# Patient Record
Sex: FEMALE | Race: WHITE | NOT HISPANIC OR LATINO | Employment: UNEMPLOYED | URBAN - METROPOLITAN AREA
[De-identification: names, ages, dates, MRNs, and addresses within clinical notes are randomized per-mention and may not be internally consistent; named-entity substitution may affect disease eponyms.]

---

## 2023-09-14 ENCOUNTER — HOSPITAL ENCOUNTER (OUTPATIENT)
Dept: DATA CONVERSION | Facility: HOSPITAL | Age: 58
Discharge: HOME | End: 2023-09-14
Payer: COMMERCIAL

## 2023-09-14 DIAGNOSIS — E89.0 POSTPROCEDURAL HYPOTHYROIDISM: ICD-10-CM

## 2023-09-14 DIAGNOSIS — E20.89 OTHER HYPOPARATHYROIDISM (CMS-HCC): ICD-10-CM

## 2023-09-14 LAB
ANION GAP SERPL CALCULATED.3IONS-SCNC: 15 MMOL/L (ref 0–19)
BUN SERPL-MCNC: 24 MG/DL (ref 8–25)
BUN/CREAT SERPL: 24 RATIO (ref 8–21)
CALCIUM SERPL-MCNC: 9 MG/DL (ref 8.5–10.4)
CHLORIDE SERPL-SCNC: 102 MMOL/L (ref 97–107)
CO2 SERPL-SCNC: 24 MMOL/L (ref 24–31)
CREAT SERPL-MCNC: 1 MG/DL (ref 0.4–1.6)
GFR SERPL CREATININE-BSD FRML MDRD: 66 ML/MIN/1.73 M2
GLUCOSE SERPL-MCNC: 185 MG/DL (ref 65–99)
POTASSIUM SERPL-SCNC: 3.7 MMOL/L (ref 3.4–5.1)
SODIUM SERPL-SCNC: 141 MMOL/L (ref 133–145)
TSH SERPL DL<=0.05 MIU/L-ACNC: 0.59 MIU/L (ref 0.27–4.2)

## 2023-09-15 PROBLEM — E20.9 HYPOPARATHYROIDISM (MULTI): Status: ACTIVE | Noted: 2021-04-09

## 2023-09-15 PROBLEM — T16.1XXA FOREIGN BODY OF RIGHT EAR: Status: ACTIVE | Noted: 2023-09-15

## 2023-09-15 PROBLEM — G93.9 PONTINE LESION: Status: ACTIVE | Noted: 2023-09-15

## 2023-09-15 PROBLEM — H53.2 DIPLOPIA: Status: ACTIVE | Noted: 2023-09-15

## 2023-09-15 PROBLEM — M79.89 LEFT LEG SWELLING: Status: ACTIVE | Noted: 2022-04-04

## 2023-09-15 PROBLEM — H90.3 SENSORINEURAL HEARING LOSS, BILATERAL: Status: ACTIVE | Noted: 2023-09-15

## 2023-09-15 PROBLEM — E66.9 OBESITY: Status: ACTIVE | Noted: 2023-09-15

## 2023-09-15 PROBLEM — I82.90 VENOUS THROMBOSIS: Status: ACTIVE | Noted: 2023-09-15

## 2023-09-15 PROBLEM — K21.9 HIATAL HERNIA WITH GERD: Status: ACTIVE | Noted: 2021-01-12

## 2023-09-15 PROBLEM — G47.33 OBSTRUCTIVE SLEEP APNEA: Status: ACTIVE | Noted: 2019-04-22

## 2023-09-15 PROBLEM — R20.2 PARESTHESIA OF UPPER EXTREMITY: Status: ACTIVE | Noted: 2023-09-15

## 2023-09-15 PROBLEM — R07.9 CHEST PAIN: Status: ACTIVE | Noted: 2021-01-12

## 2023-09-15 PROBLEM — G45.9 TRANSIENT ISCHEMIC ATTACK: Status: ACTIVE | Noted: 2023-09-15

## 2023-09-15 PROBLEM — R94.31 ABNORMAL ECG: Status: ACTIVE | Noted: 2021-01-12

## 2023-09-15 PROBLEM — R51.9 FREQUENT HEADACHES: Status: ACTIVE | Noted: 2023-09-15

## 2023-09-15 PROBLEM — E04.9 GOITER: Status: ACTIVE | Noted: 2018-05-21

## 2023-09-15 PROBLEM — E03.9 ACQUIRED HYPOTHYROIDISM: Status: ACTIVE | Noted: 2022-04-12

## 2023-09-15 PROBLEM — E83.51 HYPOCALCEMIA: Status: ACTIVE | Noted: 2023-06-12

## 2023-09-15 PROBLEM — R92.8 ABNORMAL MRI, BREAST: Status: ACTIVE | Noted: 2022-12-02

## 2023-09-15 PROBLEM — R53.83 FATIGUE: Status: ACTIVE | Noted: 2019-04-03

## 2023-09-15 PROBLEM — K21.9 GE REFLUX: Status: ACTIVE | Noted: 2018-05-21

## 2023-09-15 PROBLEM — H51.11 CONVERGENCE INSUFFICIENCY: Status: ACTIVE | Noted: 2023-09-15

## 2023-09-15 PROBLEM — E89.0 POSTPROCEDURAL HYPOTHYROIDISM: Status: ACTIVE | Noted: 2023-09-15

## 2023-09-15 PROBLEM — R06.83 SNORING: Status: ACTIVE | Noted: 2019-04-03

## 2023-09-15 PROBLEM — R06.89 IMPAIRED GAS EXCHANGE: Status: ACTIVE | Noted: 2023-09-15

## 2023-09-15 PROBLEM — H50.00 ESOTROPIA: Status: ACTIVE | Noted: 2023-09-15

## 2023-09-15 PROBLEM — M23.90 KNEE INTERNAL DERANGEMENT: Status: ACTIVE | Noted: 2019-06-03

## 2023-09-15 PROBLEM — A69.20 LYME DISEASE: Status: ACTIVE | Noted: 2023-09-15

## 2023-09-15 PROBLEM — H69.90 DYSFUNCTION OF EUSTACHIAN TUBE: Status: ACTIVE | Noted: 2021-09-21

## 2023-09-15 PROBLEM — H60.91 RIGHT OTITIS EXTERNA: Status: ACTIVE | Noted: 2019-05-21

## 2023-09-15 PROBLEM — H47.323 DRUSEN OF OPTIC DISC, BILATERAL: Status: ACTIVE | Noted: 2023-09-15

## 2023-09-15 PROBLEM — H25.099 INCIPIENT SENILE CATARACT: Status: ACTIVE | Noted: 2023-09-15

## 2023-09-15 PROBLEM — E89.2 HYPOPARATHYROIDISM AFTER PROCEDURE (MULTI): Status: ACTIVE | Noted: 2019-12-19

## 2023-09-15 PROBLEM — K44.9 HIATAL HERNIA WITH GERD: Status: ACTIVE | Noted: 2021-01-12

## 2023-09-15 PROBLEM — E78.5 HYPERLIPIDEMIA: Status: ACTIVE | Noted: 2018-05-21

## 2023-09-15 PROBLEM — R92.30 BREAST DENSITY: Status: ACTIVE | Noted: 2022-11-01

## 2023-09-15 RX ORDER — OXYCODONE AND ACETAMINOPHEN 5; 325 MG/1; MG/1
1 TABLET ORAL EVERY 4 HOURS PRN
COMMUNITY
End: 2024-04-10 | Stop reason: ALTCHOICE

## 2023-09-15 RX ORDER — CALCITRIOL 0.25 UG/1
0.25 CAPSULE ORAL DAILY
COMMUNITY
Start: 2018-05-21 | End: 2024-04-10 | Stop reason: ALTCHOICE

## 2023-09-15 RX ORDER — PSEUDOEPHEDRINE HCL 30 MG
250 TABLET ORAL
COMMUNITY
Start: 2021-04-09 | End: 2024-04-10 | Stop reason: ALTCHOICE

## 2023-09-15 RX ORDER — MAGNESIUM 250 MG
1 TABLET ORAL DAILY
COMMUNITY
End: 2024-05-01 | Stop reason: WASHOUT

## 2023-09-15 RX ORDER — OMEGA-3-ACID ETHYL ESTERS 1 G/1
1 CAPSULE, LIQUID FILLED ORAL DAILY
COMMUNITY
End: 2024-05-01 | Stop reason: WASHOUT

## 2023-09-15 RX ORDER — LEVOTHYROXINE SODIUM 150 UG/1
150 TABLET ORAL
COMMUNITY
End: 2024-04-22 | Stop reason: ALTCHOICE

## 2023-09-15 RX ORDER — LANOLIN ALCOHOL/MO/W.PET/CERES
400 CREAM (GRAM) TOPICAL DAILY
COMMUNITY

## 2023-09-15 RX ORDER — LEVOTHYROXINE SODIUM 125 UG/1
125 TABLET ORAL DAILY
COMMUNITY
Start: 2020-09-11 | End: 2024-02-02

## 2023-09-15 RX ORDER — ACETAMINOPHEN AND CODEINE PHOSPHATE 120; 12 MG/5ML; MG/5ML
12.5 SOLUTION ORAL EVERY 4 HOURS PRN
COMMUNITY
End: 2024-04-10 | Stop reason: ALTCHOICE

## 2023-09-15 RX ORDER — GLUC/MSM/COLGN2/HYAL/ANTIARTH3 375-375-20
1 TABLET ORAL EVERY 4 HOURS
COMMUNITY

## 2023-09-15 RX ORDER — PETROLATUM,WHITE/LANOLIN
1 OINTMENT (GRAM) TOPICAL 3 TIMES DAILY
COMMUNITY

## 2023-09-19 ENCOUNTER — HOSPITAL ENCOUNTER (OUTPATIENT)
Dept: DATA CONVERSION | Facility: HOSPITAL | Age: 58
Discharge: HOME | End: 2023-09-19
Payer: COMMERCIAL

## 2023-09-19 DIAGNOSIS — E20.89 OTHER HYPOPARATHYROIDISM (CMS-HCC): ICD-10-CM

## 2023-09-19 LAB
SPECIMEN VOL 24H UR: 1300 ML.
TIME PERIOD MINUTES: 1440 MINUTES

## 2023-09-20 LAB — CALCIUM 24H UR-MRATE: 257.4 (ref 0–300)

## 2023-09-29 ENCOUNTER — HOSPITAL ENCOUNTER (OUTPATIENT)
Dept: DATA CONVERSION | Facility: HOSPITAL | Age: 58
Discharge: HOME | End: 2023-09-29
Payer: COMMERCIAL

## 2023-09-29 DIAGNOSIS — K59.09 OTHER CONSTIPATION: ICD-10-CM

## 2023-09-29 DIAGNOSIS — R92.8 OTHER ABNORMAL AND INCONCLUSIVE FINDINGS ON DIAGNOSTIC IMAGING OF BREAST: ICD-10-CM

## 2023-09-29 LAB
ANION GAP SERPL CALCULATED.3IONS-SCNC: 13 MMOL/L (ref 0–19)
BUN SERPL-MCNC: 24 MG/DL (ref 8–25)
BUN/CREAT SERPL: 26.7 RATIO (ref 8–21)
CALCIUM SERPL-MCNC: 8.7 MG/DL (ref 8.5–10.4)
CHLORIDE SERPL-SCNC: 106 MMOL/L (ref 97–107)
CO2 SERPL-SCNC: 25 MMOL/L (ref 24–31)
CREAT SERPL-MCNC: 0.9 MG/DL (ref 0.4–1.6)
GFR SERPL CREATININE-BSD FRML MDRD: 75 ML/MIN/1.73 M2
GLUCOSE SERPL-MCNC: 111 MG/DL (ref 65–99)
POTASSIUM SERPL-SCNC: 4 MMOL/L (ref 3.4–5.1)
SODIUM SERPL-SCNC: 143 MMOL/L (ref 133–145)

## 2023-10-02 PROCEDURE — 80048 BASIC METABOLIC PNL TOTAL CA: CPT

## 2023-10-17 ENCOUNTER — OFFICE VISIT (OUTPATIENT)
Dept: OTOLARYNGOLOGY | Facility: CLINIC | Age: 58
End: 2023-10-17
Payer: COMMERCIAL

## 2023-10-17 VITALS — TEMPERATURE: 97.5 F | HEIGHT: 65 IN | WEIGHT: 225 LBS | BODY MASS INDEX: 37.49 KG/M2

## 2023-10-17 DIAGNOSIS — J33.8 MAXILLARY SINUS POLYP: Primary | ICD-10-CM

## 2023-10-17 DIAGNOSIS — H90.3 SENSORINEURAL HEARING LOSS, BILATERAL: ICD-10-CM

## 2023-10-17 DIAGNOSIS — D32.9 MENINGIOMA (MULTI): ICD-10-CM

## 2023-10-17 DIAGNOSIS — E20.89 OTHER HYPOPARATHYROIDISM (CMS-HCC): ICD-10-CM

## 2023-10-17 PROCEDURE — 99214 OFFICE O/P EST MOD 30 MIN: CPT | Performed by: OTOLARYNGOLOGY

## 2023-10-17 PROCEDURE — 1036F TOBACCO NON-USER: CPT | Performed by: OTOLARYNGOLOGY

## 2023-10-17 NOTE — PROGRESS NOTES
Chief Complaint   Patient presents with    Sinus Problem     LOV: 1/2021 - SINUS ISSUES, HAD MRI DONE      HPI  She is being followed by neurology and neurosurgery for meningioma in the right cavernous sinus status post gamma knife radiation.  The tumor has gotten smaller over the years and she is now on a 3-year recall.  Her most recent MRI showed mild thickening in the frontal and ethmoid sinus mucosa and a mucous retention cyst in the left maxillary sinus.  She is asymptomatic from that standpoint.  She is followed at the [facility] for her hearing loss and tinnitus.  The tinnitus has become more prominent  Beth Leigh is a 57 y.o. female  status post completion thyroidectomy for multinodular goiter in 2015 per Dr. Gutiérrez. She had some postoperative hypocalcimia and hypoparathyroidism. She is being maintained on calcium and Rocaltrol with stable calcium levels. She has bilateral hearing loss and wears bilateral behind-the-ear hearing aids.     Past Medical History:   Diagnosis Date    Neoplasm of unspecified behavior of brain (CMS/HCC)     Brain tumor    Personal history of other benign neoplasm 05/25/2021    History of meningioma    Personal history of other diseases of the digestive system     History of gastroesophageal reflux (GERD)    Personal history of other diseases of the musculoskeletal system and connective tissue     History of arthritis    Personal history of other endocrine, nutritional and metabolic disease     History of thyroid disorder    Sixth (abducent) nerve palsy, 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    Sixth cranial nerve palsy    Sixth (abducent) nerve palsy, 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    Sixth cranial nerve palsy            Medications:     Current Outpatient Medications:     acetaminophen with codeine (acetaminophen-codeine) 300 mg-30 mg /12.5 mL solution oral solution, Take 12.5 mL by mouth every 4 hours if needed (mild pain)., Disp: , Rfl:     ascorbic acid/collagen hydr (ASCORBIC  "ACID-COLLAGEN ORAL), Take by mouth., Disp: , Rfl:     aspirin 81 mg capsule, Take 1 tablet by mouth once daily., Disp: , Rfl:     calcitriol (Rocaltrol) 0.25 mcg capsule, Take 1 capsule (0.25 mcg) by mouth once daily., Disp: , Rfl:     calcium carbonate-vitamin D3 (Calcium 600 + D,3,) 600 mg-5 mcg (200 unit) capsule, Take 1 tablet by mouth every 4 hours., Disp: , Rfl:     calcium citrate 250 mg calcium tablet, Take 1 tablet (250 mg) by mouth., Disp: , Rfl:     cholecalciferol, vitamin D3, (D-3-5 ORAL), Take by mouth., Disp: , Rfl:     gluc/chondr-msm 7/C/gabriella/boron (GLUCOSAMINE-CHONDR, BOSWELLIA, ORAL), Take by mouth., Disp: , Rfl:     glucosamine sulfate 500 mg capsule, Take 1 capsule by mouth 3 times a day., Disp: , Rfl:     levothyroxine (Synthroid) 150 mcg tablet, 1 tablet (150 mcg) once daily in the morning. Take before meals., Disp: , Rfl:     levothyroxine (Synthroid, Levoxyl) 125 mcg tablet, Take 1 tablet (125 mcg) by mouth once daily., Disp: , Rfl:     magnesium 250 mg tablet, Take 1 tablet (250 mg) by mouth once daily., Disp: , Rfl:     magnesium oxide (Mag-Ox) 400 mg (241.3 mg magnesium) tablet, Take 1 tablet (400 mg) by mouth once daily., Disp: , Rfl:     multivit-min/iron/folic acid/K (ADULTS MULTIVITAMIN ORAL), Take 1 tablet by mouth once daily., Disp: , Rfl:     omega-3 acid ethyl esters (Lovaza) 1 gram capsule, Take 1 capsule (1 g) by mouth once daily., Disp: , Rfl:     oxyCODONE-acetaminophen (Percocet) 5-325 mg tablet, Take 1 tablet by mouth every 4 hours if needed for moderate pain (4 - 6)., Disp: , Rfl:     turmeric, bulk, 95 % powder, Take by mouth., Disp: , Rfl:     wheat dextrin (BENEFIBER HEALTHY SHAPE ORAL), Benefiber, Disp: , Rfl:      Allergies:  Allergies   Allergen Reactions    Acyclovir Unknown    Bacitracin Unknown    Sulfa (Sulfonamide Antibiotics) Hives        Physical Exam:  Last Recorded Vitals  Temperature 36.4 °C (97.5 °F), height 1.638 m (5' 4.5\"), weight 102 kg (225 " lb).  []General appearance: Well-developed, well-nourished in no acute distress, conversant with normal voice quality    Head/face: No erythema or edema or facial tenderness, and normal facial nerve function bilaterally    External ear: Clear external auditory canals with normal pinnae  Tube status: N/A  Middle ear: Tympanic membranes intact and mobile, middle ears normal.  Tympanic membrane perforation: N/A  Mastoid bowl: N/A  Hearing: Normal conversational awareness at normal speech thresholds    Nose visualized using: Anterior rhinoscopy  Nasal dorsum: Nontraumatic midline appearance  Septum: Midline, nonobstructing  Inferior turbinates: Normal, pink  Secretions: Dry    Oral cavity and oropharynx: Normal  Teeth: Good condition  Floor of mouth: without lesions  Palate: Normal hard palate, soft palate and uvula  Oropharynx: Clear, no lesions present  Buccal mucosa: Normal without masses or lesions  Lips: Normal    Nasopharynx: Inadequate mirror exam secondary to gag/anatomy    Larynx and hypopharynx: Mirror examination adequate and revealed epiglottis normal, vocal cord mobility normal, normal mucosa, false vocal cords normal, true vocal cords normal    Neck:  Salivary glands: Normal bilateral parotid and submandibular glands by inspection and palpation.  Non-thyroid masses: No palpable masses or significant lymphadenopathy  Trachea: Midline  Thyroid: Status post thyroidectomy  Temporomandibular joint: Nontender  Cervical range of motion: Normal    Neurologic exam: Alert and oriented x3, appropriate affect.  Cranial nerves II-XII normal bilaterally  Extraocular movement: Extraocular movement intact, normal gaze alignment      Assessment/Plan   No diagnosis found.      I have given her reassurance regarding her MRI sinus findings.  This is relatively asymptomatic.  She will continue with her hearing aids for her progressive sensorineural hearing loss and tinnitus.  Follow-up as needed    Chris Underwood MD

## 2023-10-30 DIAGNOSIS — R92.8 ABNORMAL MRI, BREAST: Primary | ICD-10-CM

## 2023-10-31 ENCOUNTER — HOSPITAL ENCOUNTER (OUTPATIENT)
Dept: RADIOLOGY | Facility: HOSPITAL | Age: 58
Discharge: HOME | End: 2023-10-31
Payer: COMMERCIAL

## 2023-10-31 ENCOUNTER — TELEPHONE (OUTPATIENT)
Dept: PRIMARY CARE | Facility: CLINIC | Age: 58
End: 2023-10-31

## 2023-10-31 VITALS — BODY MASS INDEX: 35.82 KG/M2 | WEIGHT: 215 LBS | HEIGHT: 65 IN

## 2023-10-31 DIAGNOSIS — R92.8 OTHER ABNORMAL AND INCONCLUSIVE FINDINGS ON DIAGNOSTIC IMAGING OF BREAST: ICD-10-CM

## 2023-10-31 PROCEDURE — 2550000001 HC RX 255 CONTRASTS: Performed by: PHYSICIAN ASSISTANT

## 2023-10-31 PROCEDURE — C8908 MRI W/O FOL W/CONT, BREAST,: HCPCS

## 2023-10-31 PROCEDURE — 77049 MRI BREAST C-+ W/CAD BI: CPT | Mod: 50

## 2023-10-31 PROCEDURE — A9575 INJ GADOTERATE MEGLUMI 0.1ML: HCPCS | Performed by: PHYSICIAN ASSISTANT

## 2023-10-31 RX ORDER — GADOTERATE MEGLUMINE 376.9 MG/ML
19 INJECTION INTRAVENOUS
Status: COMPLETED | OUTPATIENT
Start: 2023-10-31 | End: 2023-10-31

## 2023-10-31 RX ADMIN — GADOTERATE MEGLUMINE 19 ML: 376.9 INJECTION INTRAVENOUS at 08:20

## 2023-10-31 NOTE — TELEPHONE ENCOUNTER
Pt got the multi collagen protein made by [employer]. She is wanting to know if this will affect with any of the medications she is on   Contact -[phone]

## 2024-02-02 DIAGNOSIS — E89.0 POSTPROCEDURAL HYPOTHYROIDISM: Primary | ICD-10-CM

## 2024-02-02 RX ORDER — LEVOTHYROXINE SODIUM 125 UG/1
125 TABLET ORAL DAILY
Qty: 90 TABLET | Refills: 1 | Status: SHIPPED | OUTPATIENT
Start: 2024-02-02

## 2024-04-10 ENCOUNTER — OFFICE VISIT (OUTPATIENT)
Dept: PRIMARY CARE | Facility: CLINIC | Age: 59
End: 2024-04-10
Payer: COMMERCIAL

## 2024-04-10 ENCOUNTER — TELEPHONE (OUTPATIENT)
Dept: PRIMARY CARE | Facility: CLINIC | Age: 59
End: 2024-04-10

## 2024-04-10 VITALS
HEIGHT: 65 IN | HEART RATE: 88 BPM | DIASTOLIC BLOOD PRESSURE: 72 MMHG | TEMPERATURE: 97.2 F | BODY MASS INDEX: 35.49 KG/M2 | OXYGEN SATURATION: 98 % | WEIGHT: 213 LBS | SYSTOLIC BLOOD PRESSURE: 114 MMHG

## 2024-04-10 DIAGNOSIS — Z12.31 VISIT FOR SCREENING MAMMOGRAM: ICD-10-CM

## 2024-04-10 DIAGNOSIS — M79.622 LEFT UPPER ARM PAIN: Primary | ICD-10-CM

## 2024-04-10 PROCEDURE — 1036F TOBACCO NON-USER: CPT

## 2024-04-10 PROCEDURE — 99213 OFFICE O/P EST LOW 20 MIN: CPT

## 2024-04-10 ASSESSMENT — PATIENT HEALTH QUESTIONNAIRE - PHQ9
SUM OF ALL RESPONSES TO PHQ9 QUESTIONS 1 AND 2: 0
1. LITTLE INTEREST OR PLEASURE IN DOING THINGS: NOT AT ALL
2. FEELING DOWN, DEPRESSED OR HOPELESS: NOT AT ALL

## 2024-04-10 ASSESSMENT — PAIN SCALES - GENERAL: PAINLEVEL: 5

## 2024-04-10 NOTE — TELEPHONE ENCOUNTER
PATIENT WOULD LIKE A REFERRAL FOR MAMMOGRAM AND A MRI FOR BREAST CANCER.    PATIENT CAN BE REACHED [phone].    PATIENT IS SCHEDULED FOR CPE 04-29-24.

## 2024-04-10 NOTE — PROGRESS NOTES
"Subjective   Patient ID: Beth Leigh is a 58 y.o. female who presents for Shoulder Injury (Left X today (04/10/2024)).    HPI   Beth is seen for complaints of left arm injury today.  Was moving bags of mulch when she reports feeling a pop to her left forearm.  Reports pain with pronation, supination, flexion of LUE.  Has not taken anything for the pain so far.  Patient is right-handed.  Denies numbness, tingling, weakness, loss of sensation of LUE.    Review of Systems  All other systems have been reviewed and are negative except as noted in the HPI.     Objective   /72 (BP Location: Right arm)   Pulse 88   Temp 36.2 °C (97.2 °F) (Temporal)   Ht 1.651 m (5' 5\")   Wt 96.6 kg (213 lb)   SpO2 98%   BMI 35.45 kg/m²     Physical Exam  Vitals and nursing note reviewed.   Constitutional:       General: She is not in acute distress.  Eyes:      Extraocular Movements: Extraocular movements intact.      Conjunctiva/sclera: Conjunctivae normal.   Cardiovascular:      Rate and Rhythm: Normal rate and regular rhythm.   Pulmonary:      Effort: Pulmonary effort is normal.      Breath sounds: Normal breath sounds.   Musculoskeletal:      Right shoulder: Normal.      Left shoulder: Normal range of motion.      Right upper arm: Normal.      Left upper arm: Tenderness (Proximal forearm, near insertion of biceps tendon.) present. No swelling, edema or deformity.      Right elbow: Normal.      Left elbow: Normal.      Right forearm: Normal.      Left forearm: Normal.      Right wrist: Normal.      Left wrist: Normal.      Right hand: Normal.      Left hand: Normal.      Cervical back: Neck supple.      Comments: No ecchymosis, bulging, swelling of LUE.   Skin:     General: Skin is warm.   Neurological:      General: No focal deficit present.      Mental Status: She is alert.   Psychiatric:         Mood and Affect: Mood normal.         Assessment/Plan   Problem List Items Addressed This Visit    None  Visit Diagnoses  "        Codes    Left upper arm pain    -  Primary  Biceps tendinitis vs. tear of biceps tendon.  Low suspicion for biceps tendon rupture/complete tear.  OTC Tylenol/ibuprofen as directed for arm pain.  Rest, heat/ice, passive stretching.  Will order ultrasound LUE to evaluate for biceps tear.  Follow-up in 1 week to reevaluate.  Patient instructed to follow-up sooner for weakness, loss of sensation, bruising to the area.  Patient agrees and understands with plan. M79.622    Relevant Orders    US MSK upper extremity joints tendons muscles

## 2024-04-11 NOTE — TELEPHONE ENCOUNTER
Order for mammogram pended. Called and informed patient of this and that breast MRI not due until October.

## 2024-04-15 ENCOUNTER — APPOINTMENT (OUTPATIENT)
Dept: RADIOLOGY | Facility: HOSPITAL | Age: 59
End: 2024-04-15
Payer: COMMERCIAL

## 2024-04-19 ENCOUNTER — HOSPITAL ENCOUNTER (OUTPATIENT)
Dept: RADIOLOGY | Facility: HOSPITAL | Age: 59
Discharge: HOME | End: 2024-04-19
Payer: COMMERCIAL

## 2024-04-19 DIAGNOSIS — M79.622 LEFT UPPER ARM PAIN: ICD-10-CM

## 2024-04-19 PROCEDURE — 76882 US LMTD JT/FCL EVL NVASC XTR: CPT | Performed by: RADIOLOGY

## 2024-04-19 PROCEDURE — 76881 US COMPL JOINT R-T W/IMG: CPT

## 2024-04-22 ENCOUNTER — OFFICE VISIT (OUTPATIENT)
Dept: PRIMARY CARE | Facility: CLINIC | Age: 59
End: 2024-04-22
Payer: COMMERCIAL

## 2024-04-22 VITALS
WEIGHT: 219 LBS | HEIGHT: 65 IN | DIASTOLIC BLOOD PRESSURE: 69 MMHG | HEART RATE: 96 BPM | BODY MASS INDEX: 36.49 KG/M2 | SYSTOLIC BLOOD PRESSURE: 99 MMHG | OXYGEN SATURATION: 97 %

## 2024-04-22 DIAGNOSIS — M75.102 TEAR OF LEFT SUPRASPINATUS TENDON: ICD-10-CM

## 2024-04-22 DIAGNOSIS — M65.9: Primary | ICD-10-CM

## 2024-04-22 PROCEDURE — 1036F TOBACCO NON-USER: CPT

## 2024-04-22 PROCEDURE — 99213 OFFICE O/P EST LOW 20 MIN: CPT

## 2024-04-22 ASSESSMENT — PATIENT HEALTH QUESTIONNAIRE - PHQ9
1. LITTLE INTEREST OR PLEASURE IN DOING THINGS: NOT AT ALL
SUM OF ALL RESPONSES TO PHQ9 QUESTIONS 1 AND 2: 0
2. FEELING DOWN, DEPRESSED OR HOPELESS: NOT AT ALL

## 2024-04-22 ASSESSMENT — ENCOUNTER SYMPTOMS
DEPRESSION: 0
OCCASIONAL FEELINGS OF UNSTEADINESS: 0
LOSS OF SENSATION IN FEET: 0

## 2024-04-22 ASSESSMENT — COLUMBIA-SUICIDE SEVERITY RATING SCALE - C-SSRS
6. HAVE YOU EVER DONE ANYTHING, STARTED TO DO ANYTHING, OR PREPARED TO DO ANYTHING TO END YOUR LIFE?: NO
1. IN THE PAST MONTH, HAVE YOU WISHED YOU WERE DEAD OR WISHED YOU COULD GO TO SLEEP AND NOT WAKE UP?: NO
2. HAVE YOU ACTUALLY HAD ANY THOUGHTS OF KILLING YOURSELF?: NO

## 2024-04-22 ASSESSMENT — PAIN SCALES - GENERAL: PAINLEVEL: 2

## 2024-04-22 NOTE — PROGRESS NOTES
"Subjective   Patient ID: Beth Leigh is a 58 y.o. female who presents for Follow-up (On left arm ).    HPI   Beth is seen for follow-up left arm injury.  Was seen on 4/10, treated with supportive care.  Ultrasound completed and shows mild tenosynovitis of tendon of long head of the biceps, possible partial tear.  Also finding of full-thickness tear of supraspinatus tendon and partial thickness tear of subscapularis.  Pain and range of motion have since improved.  Has been increasing activity as tolerated.  Denies numbness, tingling, weakness, limited ROM of LUE.    Review of Systems  All other systems have been reviewed and are negative except as noted in the HPI.     Objective   BP 99/69 (BP Location: Left arm, Patient Position: Sitting, BP Cuff Size: Adult)   Pulse 96   Ht 1.651 m (5' 5\")   Wt 99.3 kg (219 lb)   SpO2 97%   BMI 36.44 kg/m²     Physical Exam  Vitals and nursing note reviewed.   Constitutional:       General: She is not in acute distress.  Eyes:      Extraocular Movements: Extraocular movements intact.      Conjunctiva/sclera: Conjunctivae normal.   Cardiovascular:      Rate and Rhythm: Normal rate.   Pulmonary:      Effort: Pulmonary effort is normal.   Musculoskeletal:         General: Normal range of motion.      Right shoulder: Normal.      Left shoulder: Normal.      Right upper arm: Normal.      Left upper arm: Normal.      Right elbow: Normal.      Left elbow: Normal.      Right forearm: Normal.      Left forearm: Normal.      Right wrist: Normal.      Left wrist: Normal.      Right hand: Normal.      Left hand: Normal.      Cervical back: Neck supple.   Skin:     General: Skin is warm.   Neurological:      General: No focal deficit present.      Mental Status: She is alert.   Psychiatric:         Mood and Affect: Mood normal.       Assessment/Plan   Problem List Items Addressed This Visit    None  Visit Diagnoses         Codes    Tenosynovitis of upper arm    -  " Primary  Acute.  Continue OTC Tylenol/ibuprofen as directed for pain.  Heat, ice, rest.  Increase activity as tolerated.  Discussed referral to PT, patient declines at this time and would prefer to see orthopedic surgery for second opinion.  Will place referral. M65.9    Relevant Orders    Referral to Orthopaedic Surgery    Tear of left supraspinatus tendon     Likely incidental finding.  Discussed referral to PT, patient declines at this time and would prefer to see orthopedic surgery for second opinion.  Will place referral. M75.102    Relevant Orders    Referral to Orthopaedic Surgery

## 2024-04-26 ENCOUNTER — HOSPITAL ENCOUNTER (OUTPATIENT)
Dept: RADIOLOGY | Facility: HOSPITAL | Age: 59
Discharge: HOME | End: 2024-04-26
Payer: COMMERCIAL

## 2024-04-26 VITALS — HEIGHT: 66 IN | WEIGHT: 210 LBS | BODY MASS INDEX: 33.75 KG/M2

## 2024-04-26 DIAGNOSIS — Z12.31 VISIT FOR SCREENING MAMMOGRAM: ICD-10-CM

## 2024-04-26 PROCEDURE — 77063 BREAST TOMOSYNTHESIS BI: CPT | Performed by: STUDENT IN AN ORGANIZED HEALTH CARE EDUCATION/TRAINING PROGRAM

## 2024-04-26 PROCEDURE — 77067 SCR MAMMO BI INCL CAD: CPT

## 2024-04-26 PROCEDURE — 77067 SCR MAMMO BI INCL CAD: CPT | Performed by: STUDENT IN AN ORGANIZED HEALTH CARE EDUCATION/TRAINING PROGRAM

## 2024-04-29 ENCOUNTER — OFFICE VISIT (OUTPATIENT)
Dept: PRIMARY CARE | Facility: CLINIC | Age: 59
End: 2024-04-29
Payer: COMMERCIAL

## 2024-04-29 VITALS
SYSTOLIC BLOOD PRESSURE: 108 MMHG | BODY MASS INDEX: 35.56 KG/M2 | WEIGHT: 217 LBS | HEART RATE: 98 BPM | DIASTOLIC BLOOD PRESSURE: 70 MMHG | OXYGEN SATURATION: 96 %

## 2024-04-29 DIAGNOSIS — E03.9 ACQUIRED HYPOTHYROIDISM: ICD-10-CM

## 2024-04-29 DIAGNOSIS — E04.9 GOITER: ICD-10-CM

## 2024-04-29 DIAGNOSIS — H90.3 SENSORINEURAL HEARING LOSS, BILATERAL: ICD-10-CM

## 2024-04-29 DIAGNOSIS — G93.9 PONTINE LESION: ICD-10-CM

## 2024-04-29 DIAGNOSIS — Z00.00 GENERAL MEDICAL EXAM: Primary | ICD-10-CM

## 2024-04-29 DIAGNOSIS — K21.9 GASTROESOPHAGEAL REFLUX DISEASE, UNSPECIFIED WHETHER ESOPHAGITIS PRESENT: ICD-10-CM

## 2024-04-29 DIAGNOSIS — E78.5 HYPERLIPIDEMIA, UNSPECIFIED HYPERLIPIDEMIA TYPE: ICD-10-CM

## 2024-04-29 PROBLEM — M23.90 KNEE INTERNAL DERANGEMENT: Status: RESOLVED | Noted: 2019-06-03 | Resolved: 2024-04-29

## 2024-04-29 PROBLEM — T16.1XXA FOREIGN BODY OF RIGHT EAR: Status: RESOLVED | Noted: 2023-09-15 | Resolved: 2024-04-29

## 2024-04-29 PROBLEM — R06.89 IMPAIRED GAS EXCHANGE: Status: RESOLVED | Noted: 2023-09-15 | Resolved: 2024-04-29

## 2024-04-29 PROBLEM — R07.9 CHEST PAIN: Status: RESOLVED | Noted: 2021-01-12 | Resolved: 2024-04-29

## 2024-04-29 PROBLEM — M79.89 LEFT LEG SWELLING: Status: RESOLVED | Noted: 2022-04-04 | Resolved: 2024-04-29

## 2024-04-29 PROBLEM — H60.91 RIGHT OTITIS EXTERNA: Status: RESOLVED | Noted: 2019-05-21 | Resolved: 2024-04-29

## 2024-04-29 PROBLEM — E83.51 HYPOCALCEMIA: Status: RESOLVED | Noted: 2023-06-12 | Resolved: 2024-04-29

## 2024-04-29 PROCEDURE — 99396 PREV VISIT EST AGE 40-64: CPT | Performed by: PHYSICIAN ASSISTANT

## 2024-04-29 ASSESSMENT — ENCOUNTER SYMPTOMS
CONSTIPATION: 0
DIZZINESS: 0
ABDOMINAL PAIN: 0
ACTIVITY CHANGE: 0
DIARRHEA: 0
NAUSEA: 0
COLOR CHANGE: 0
BLOOD IN STOOL: 0
TREMORS: 0
PALPITATIONS: 0
WHEEZING: 0
APPETITE CHANGE: 0
MYALGIAS: 0
FREQUENCY: 0
SHORTNESS OF BREATH: 0
ARTHRALGIAS: 0
CHEST TIGHTNESS: 0
SLEEP DISTURBANCE: 0
LIGHT-HEADEDNESS: 0
NERVOUS/ANXIOUS: 0

## 2024-04-29 ASSESSMENT — PAIN SCALES - GENERAL: PAINLEVEL: 2

## 2024-04-29 NOTE — PROGRESS NOTES
Subjective   Patient ID: Beth Leigh is a 58 y.o. female who presents for Annual Exam (Hysterectomy ).    HPI   Patient sees her neuro-ophthalmologist on regular basis.  Along with her neurologist for her  meningioma.  Review of Systems   Constitutional:  Negative for activity change and appetite change.   HENT:  Negative for dental problem.    Eyes:  Negative for visual disturbance.   Respiratory:  Negative for chest tightness, shortness of breath and wheezing.    Cardiovascular:  Negative for chest pain, palpitations and leg swelling.   Gastrointestinal:  Negative for abdominal pain, blood in stool, constipation, diarrhea and nausea.   Endocrine: Negative for cold intolerance and heat intolerance.   Genitourinary:  Negative for frequency and urgency.   Musculoskeletal:  Negative for arthralgias and myalgias.   Skin:  Negative for color change.   Allergic/Immunologic: Negative for immunocompromised state.   Neurological:  Negative for dizziness, tremors and light-headedness.   Psychiatric/Behavioral:  Negative for behavioral problems and sleep disturbance. The patient is not nervous/anxious.        Objective   /70   Pulse 98   Wt 98.4 kg (217 lb)   SpO2 96%   BMI 35.56 kg/m²     Physical Exam  HENT:      Right Ear: Tympanic membrane normal.      Left Ear: Tympanic membrane normal.      Nose: Nose normal.      Mouth/Throat:      Mouth: Mucous membranes are moist.   Cardiovascular:      Rate and Rhythm: Normal rate and regular rhythm.      Pulses: Normal pulses.   Pulmonary:      Effort: Pulmonary effort is normal. No respiratory distress.   Abdominal:      General: Bowel sounds are normal.      Tenderness: There is no abdominal tenderness.   Musculoskeletal:      Cervical back: Normal range of motion. No tenderness.      Right lower leg: No edema.      Left lower leg: No edema.   Skin:     General: Skin is warm.   Neurological:      General: No focal deficit present.      Mental Status: She is alert.  Mental status is at baseline.   Psychiatric:         Mood and Affect: Mood normal.         Thought Content: Thought content normal.         Judgment: Judgment normal.         Assessment/Plan   Diagnoses and all orders for this visit:  General medical exam  -     CBC and Auto Differential; Future  -     Comprehensive Metabolic Panel; Future  -     Hemoglobin A1C; Future  -     Lipid Panel; Future  -     TSH with reflex to Free T4 if abnormal; Future  -     Urinalysis with Reflex Microscopic; Future  Acquired hypothyroidism  -     TSH with reflex to Free T4 if abnormal; Future  Hyperlipidemia, unspecified hyperlipidemia type  -     Comprehensive Metabolic Panel; Future  -     Hemoglobin A1C; Future  Gastroesophageal reflux disease, unspecified whether esophagitis present  -     CBC and Auto Differential; Future  -     Comprehensive Metabolic Panel; Future  Goiter  Pontine lesion  Sensorineural hearing loss, bilateral  Discussed working on weight loss which she is trying to improve upon along with exercise.

## 2024-04-30 ENCOUNTER — TELEPHONE (OUTPATIENT)
Dept: PRIMARY CARE | Facility: CLINIC | Age: 59
End: 2024-04-30
Payer: COMMERCIAL

## 2024-05-09 ENCOUNTER — HOSPITAL ENCOUNTER (OUTPATIENT)
Dept: RADIOLOGY | Facility: HOSPITAL | Age: 59
Discharge: HOME | End: 2024-05-09
Payer: COMMERCIAL

## 2024-05-09 DIAGNOSIS — M75.102 UNSPECIFIED ROTATOR CUFF TEAR OR RUPTURE OF LEFT SHOULDER, NOT SPECIFIED AS TRAUMATIC: ICD-10-CM

## 2024-05-09 PROCEDURE — 73221 MRI JOINT UPR EXTREM W/O DYE: CPT | Mod: LT

## 2024-05-09 PROCEDURE — 73221 MRI JOINT UPR EXTREM W/O DYE: CPT | Mod: LEFT SIDE | Performed by: RADIOLOGY

## 2024-07-12 NOTE — PROGRESS NOTES
HPI   59 yo presents for thyroid/parathyroid here for yearly f/u. Pt has hx of thyroid surgery for benign disease complicated by iatrogenic hypoparathyroidism.           She has been taking calcitriol 0.25mcg qd along with Ca 600mg/vitd (4 pills/day, spaced through the day), after 2023 visit held calcitriol and repeat calcium-wnl, no sx of low calcium.           Taking levothyroxine 125mcg qd(has fluctuated from 100-125mcg). Pt takes on waking, not missing doses, taking on empty stomach, euthyroid.     -(low threshold for hctz to lower urinary calcium, bp borderline low, K low normal is why not added at this point)      Current Outpatient Medications:     calcium carbonate-vitamin D3 (Calcium 600 + D,3,) 600 mg-5 mcg (200 unit) capsule, Take 1 tablet by mouth every 4 hours., Disp: , Rfl:     glucosamine sulfate 500 mg capsule, Take 1 capsule by mouth 3 times a day., Disp: , Rfl:     levothyroxine (Synthroid, Levoxyl) 125 mcg tablet, TAKE ONE TABLET BY MOUTH EVERY DAY, Disp: 90 tablet, Rfl: 1    magnesium oxide (Mag-Ox) 400 mg (241.3 mg magnesium) tablet, Take 1 tablet (400 mg) by mouth once daily., Disp: , Rfl:     multivit-min/iron/folic acid/K (ADULTS MULTIVITAMIN ORAL), Take 1 tablet by mouth once daily., Disp: , Rfl:     turmeric, bulk, 95 % powder, Take by mouth., Disp: , Rfl:     ascorbic acid/collagen hydr (ASCORBIC ACID-COLLAGEN ORAL), Take by mouth., Disp: , Rfl:     Boswellia silvia-turmeric xt 500 mg capsule, Take by mouth., Disp: , Rfl:     gluc/chondr-msm 7/C/gabriella/boron (GLUCOSAMINE-CHONDR, BOSWELLIA, ORAL), Take by mouth., Disp: , Rfl:       Allergies as of 07/15/2024 - Reviewed 07/15/2024   Allergen Reaction Noted    Acyclovir Unknown 09/15/2023    Bacitracin Unknown 09/15/2023    Sulfa (sulfonamide antibiotics) Hives 09/15/2023         Review of Systems   Cardiology: Lightheadedness-denies.  Chest pain-denies.  Leg edema-denies.  Palpitations-denies.  Respiratory: Cough-denies. Shortness of  "breath-denies.  Wheezing-denies.  Gastroenterology: Constipation-denies.  Diarrhea-denies.  Heartburn-denies.  Endocrinology: Cold intolerance-denies.  Heat intolerance-denies.  Sweats-denies.  Neurology: Headache-denies.  Tremor-denies.  Neuropathy in extremities-denies.  Psychology: Low energy-denies.  Irritability-denies.  Sleep disturbances-denies.      /68   Pulse 88   Ht 1.664 m (5' 5.5\")   Wt 101 kg (223 lb 6.4 oz)   BMI 36.61 kg/m²       Labs:  Lab Results   Component Value Date    WBC 8.8 04/19/2023    NRBC 0 04/19/2023    RBC 4.12 04/19/2023    HGB 12.9 04/19/2023    HCT 37.6 04/19/2023     04/19/2023     Lab Results   Component Value Date    CALCIUM 8.9 10/02/2023    AST 17 04/19/2023    ALKPHOS 88 04/19/2023    BILITOT 0.2 04/19/2023    PROT 7.6 04/19/2023    ALBUMIN 4.2 04/19/2023    GLOB 3.4 04/19/2023    AGR 1.2 (L) 04/19/2023     10/02/2023    K 4.3 10/02/2023     10/02/2023    CO2 26 10/02/2023    ANIONGAP 11 10/02/2023    BUN 18 10/02/2023    CREATININE 0.80 10/02/2023    UREACREAUR 26.7 (H) 09/29/2023    GLUCOSE 92 10/02/2023    ALT 13 04/19/2023    EGFR 87 10/02/2023     Lab Results   Component Value Date    CHOL 232 (H) 04/19/2023    TRIG 141 04/19/2023    HDL 61 04/19/2023    LDLCALC 143 (H) 04/19/2023     No results found for: \"MICROALBCREA\"  Lab Results   Component Value Date    TSH 0.59 09/14/2023     No results found for: \"[id number]\"  Lab Results   Component Value Date    HGBA1C 5.5 06/01/2018         Physical Exam   General Appearance: pleasant, cooperative, no acute distress  HEENT: no chemosis, no proptosis, no lid lag, no lid retraction  Neck: no lymphadenopathy, no thyromegaly, no dominant thyroid nodules  Heart: no murmurs, regular rate and rhythm, S1 and S2  Lungs: no wheezes, no rhonci, no rales  Extremities: no lower extremity swelling      Assessment/Plan   1. Postprocedural hypothyroidism  -euthyroid, repeat labs and follow yearly    2. " Hypoparathyroidism after procedure (Multi)  -off calcitriol, calcium normal, no low hypocalcemia sx  -repeat 24hr urine off calcitriol (urinary calcium in mid 200's on calcitriol)      Follow Up:  Whit 1 year    -labs/tests/notes reviewed  -reviewed and counseled patient on medication monitoring and side effects

## 2024-07-15 ENCOUNTER — APPOINTMENT (OUTPATIENT)
Dept: ENDOCRINOLOGY | Facility: CLINIC | Age: 59
End: 2024-07-15
Payer: COMMERCIAL

## 2024-07-15 VITALS
SYSTOLIC BLOOD PRESSURE: 103 MMHG | HEART RATE: 88 BPM | BODY MASS INDEX: 35.9 KG/M2 | WEIGHT: 223.4 LBS | DIASTOLIC BLOOD PRESSURE: 68 MMHG | HEIGHT: 66 IN

## 2024-07-15 DIAGNOSIS — E89.0 POSTPROCEDURAL HYPOTHYROIDISM: Primary | ICD-10-CM

## 2024-07-15 DIAGNOSIS — E89.2 HYPOPARATHYROIDISM AFTER PROCEDURE (MULTI): ICD-10-CM

## 2024-07-15 PROCEDURE — 1036F TOBACCO NON-USER: CPT | Performed by: INTERNAL MEDICINE

## 2024-07-15 PROCEDURE — 99213 OFFICE O/P EST LOW 20 MIN: CPT | Performed by: INTERNAL MEDICINE

## 2024-07-15 RX ORDER — LEVOTHYROXINE SODIUM 125 UG/1
125 TABLET ORAL DAILY
Qty: 90 TABLET | Refills: 3 | Status: SHIPPED | OUTPATIENT
Start: 2024-07-15

## 2024-07-15 ASSESSMENT — LIFESTYLE VARIABLES
AUDIT-C TOTAL SCORE: 0
SKIP TO QUESTIONS 9-10: 1
HOW OFTEN DO YOU HAVE A DRINK CONTAINING ALCOHOL: NEVER
HOW OFTEN DO YOU HAVE SIX OR MORE DRINKS ON ONE OCCASION: NEVER
HOW MANY STANDARD DRINKS CONTAINING ALCOHOL DO YOU HAVE ON A TYPICAL DAY: PATIENT DOES NOT DRINK

## 2024-07-15 ASSESSMENT — ENCOUNTER SYMPTOMS
DEPRESSION: 0
LOSS OF SENSATION IN FEET: 0
OCCASIONAL FEELINGS OF UNSTEADINESS: 0

## 2024-07-15 ASSESSMENT — PATIENT HEALTH QUESTIONNAIRE - PHQ9
2. FEELING DOWN, DEPRESSED OR HOPELESS: NOT AT ALL
SUM OF ALL RESPONSES TO PHQ9 QUESTIONS 1 & 2: 0
1. LITTLE INTEREST OR PLEASURE IN DOING THINGS: NOT AT ALL

## 2024-07-15 ASSESSMENT — PAIN SCALES - GENERAL: PAINLEVEL: 0-NO PAIN

## 2024-08-28 ENCOUNTER — LAB (OUTPATIENT)
Dept: LAB | Facility: LAB | Age: 59
End: 2024-08-28
Payer: COMMERCIAL

## 2024-08-28 DIAGNOSIS — K21.9 GASTROESOPHAGEAL REFLUX DISEASE, UNSPECIFIED WHETHER ESOPHAGITIS PRESENT: ICD-10-CM

## 2024-08-28 DIAGNOSIS — E89.2 HYPOPARATHYROIDISM AFTER PROCEDURE (MULTI): ICD-10-CM

## 2024-08-28 DIAGNOSIS — E03.9 ACQUIRED HYPOTHYROIDISM: ICD-10-CM

## 2024-08-28 DIAGNOSIS — E89.0 POSTPROCEDURAL HYPOTHYROIDISM: ICD-10-CM

## 2024-08-28 DIAGNOSIS — Z00.00 GENERAL MEDICAL EXAM: ICD-10-CM

## 2024-08-28 DIAGNOSIS — E78.5 HYPERLIPIDEMIA, UNSPECIFIED HYPERLIPIDEMIA TYPE: ICD-10-CM

## 2024-08-28 LAB
ALBUMIN SERPL-MCNC: 4.3 G/DL (ref 3.5–5)
ALP BLD-CCNC: 91 U/L (ref 35–125)
ALT SERPL-CCNC: 13 U/L (ref 5–40)
ANION GAP SERPL CALC-SCNC: 16 MMOL/L
APPEARANCE UR: CLEAR
AST SERPL-CCNC: 15 U/L (ref 5–40)
BASOPHILS # BLD AUTO: 0.06 X10*3/UL (ref 0–0.1)
BASOPHILS NFR BLD AUTO: 0.7 %
BILIRUB SERPL-MCNC: <0.2 MG/DL (ref 0.1–1.2)
BILIRUB UR STRIP.AUTO-MCNC: NEGATIVE MG/DL
BUN SERPL-MCNC: 18 MG/DL (ref 8–25)
CALCIUM SERPL-MCNC: 8.9 MG/DL (ref 8.5–10.4)
CHLORIDE SERPL-SCNC: 102 MMOL/L (ref 97–107)
CHOLEST SERPL-MCNC: 239 MG/DL (ref 133–200)
CHOLEST/HDLC SERPL: 3.4 {RATIO}
CO2 SERPL-SCNC: 24 MMOL/L (ref 24–31)
COLOR UR: YELLOW
CREAT SERPL-MCNC: 0.7 MG/DL (ref 0.4–1.6)
EGFRCR SERPLBLD CKD-EPI 2021: >90 ML/MIN/1.73M*2
EOSINOPHIL # BLD AUTO: 0.3 X10*3/UL (ref 0–0.7)
EOSINOPHIL NFR BLD AUTO: 3.4 %
ERYTHROCYTE [DISTWIDTH] IN BLOOD BY AUTOMATED COUNT: 12.7 % (ref 11.5–14.5)
EST. AVERAGE GLUCOSE BLD GHB EST-MCNC: 105 MG/DL
GLUCOSE SERPL-MCNC: 64 MG/DL (ref 65–99)
GLUCOSE UR STRIP.AUTO-MCNC: NORMAL MG/DL
HBA1C MFR BLD: 5.3 %
HCT VFR BLD AUTO: 38.6 % (ref 36–46)
HDLC SERPL-MCNC: 70 MG/DL
HGB BLD-MCNC: 12.8 G/DL (ref 12–16)
IMM GRANULOCYTES # BLD AUTO: 0.04 X10*3/UL (ref 0–0.7)
IMM GRANULOCYTES NFR BLD AUTO: 0.5 % (ref 0–0.9)
KETONES UR STRIP.AUTO-MCNC: NEGATIVE MG/DL
LDLC SERPL CALC-MCNC: 139 MG/DL (ref 65–130)
LEUKOCYTE ESTERASE UR QL STRIP.AUTO: ABNORMAL
LYMPHOCYTES # BLD AUTO: 2.52 X10*3/UL (ref 1.2–4.8)
LYMPHOCYTES NFR BLD AUTO: 28.7 %
MCH RBC QN AUTO: 30.5 PG (ref 26–34)
MCHC RBC AUTO-ENTMCNC: 33.2 G/DL (ref 32–36)
MCV RBC AUTO: 92 FL (ref 80–100)
MONOCYTES # BLD AUTO: 0.85 X10*3/UL (ref 0.1–1)
MONOCYTES NFR BLD AUTO: 9.7 %
NEUTROPHILS # BLD AUTO: 5.01 X10*3/UL (ref 1.2–7.7)
NEUTROPHILS NFR BLD AUTO: 57 %
NITRITE UR QL STRIP.AUTO: NEGATIVE
NRBC BLD-RTO: 0 /100 WBCS (ref 0–0)
PH UR STRIP.AUTO: 6 [PH]
PLATELET # BLD AUTO: 411 X10*3/UL (ref 150–450)
POTASSIUM SERPL-SCNC: 4.3 MMOL/L (ref 3.4–5.1)
PROT SERPL-MCNC: 7.3 G/DL (ref 5.9–7.9)
PROT UR STRIP.AUTO-MCNC: NEGATIVE MG/DL
RBC # BLD AUTO: 4.19 X10*6/UL (ref 4–5.2)
RBC # UR STRIP.AUTO: NEGATIVE /UL
RBC #/AREA URNS AUTO: NORMAL /HPF
SODIUM SERPL-SCNC: 142 MMOL/L (ref 133–145)
SP GR UR STRIP.AUTO: 1.01
SQUAMOUS #/AREA URNS AUTO: NORMAL /HPF
TRIGL SERPL-MCNC: 151 MG/DL (ref 40–150)
TSH SERPL DL<=0.05 MIU/L-ACNC: 0.65 MIU/L (ref 0.27–4.2)
UROBILINOGEN UR STRIP.AUTO-MCNC: NORMAL MG/DL
WBC # BLD AUTO: 8.8 X10*3/UL (ref 4.4–11.3)
WBC #/AREA URNS AUTO: NORMAL /HPF

## 2024-09-04 ENCOUNTER — LAB (OUTPATIENT)
Dept: LAB | Facility: LAB | Age: 59
End: 2024-09-04
Payer: COMMERCIAL

## 2024-09-04 DIAGNOSIS — E89.2 HYPOPARATHYROIDISM AFTER PROCEDURE (MULTI): ICD-10-CM

## 2024-09-04 LAB
CALCIUM (MG/L) IN 24 HOUR URINE: 233 MG/24H (ref 100–300)
CALCIUM 24H UR-MRATE: 15.5 MG/DL
COLLECT DURATION TIME SPEC: 24 HRS
CREAT 24H UR-MCNC: 82.6 MG/DL (ref 20–320)
CREAT 24H UR-MRATE: 1.24 G/24 H (ref 0.67–1.59)
SPECIMEN VOL 24H UR: 1500 ML

## 2024-10-04 ENCOUNTER — TELEPHONE (OUTPATIENT)
Dept: PRIMARY CARE | Facility: CLINIC | Age: 59
End: 2024-10-04
Payer: COMMERCIAL

## 2024-10-04 DIAGNOSIS — R92.8 ABNORMAL MRI, BREAST: ICD-10-CM

## 2024-10-04 DIAGNOSIS — R92.30 BREAST DENSITY: ICD-10-CM

## 2024-10-04 NOTE — TELEPHONE ENCOUNTER
Patient stopped in the request an order for her annual MRI bilateral breast w/ contrast.    Please Advise: [phone]

## 2024-10-22 ENCOUNTER — HOSPITAL ENCOUNTER (OUTPATIENT)
Dept: RADIOLOGY | Facility: HOSPITAL | Age: 59
Discharge: HOME | End: 2024-10-22
Payer: COMMERCIAL

## 2024-10-22 DIAGNOSIS — R92.8 ABNORMAL MRI, BREAST: ICD-10-CM

## 2024-10-22 DIAGNOSIS — R92.30 BREAST DENSITY: ICD-10-CM

## 2024-10-22 PROCEDURE — 2550000001 HC RX 255 CONTRASTS: Performed by: PHYSICIAN ASSISTANT

## 2024-10-22 PROCEDURE — A9575 INJ GADOTERATE MEGLUMI 0.1ML: HCPCS | Performed by: PHYSICIAN ASSISTANT

## 2024-10-22 PROCEDURE — 77049 MRI BREAST C-+ W/CAD BI: CPT | Performed by: RADIOLOGY

## 2024-10-22 PROCEDURE — 77049 MRI BREAST C-+ W/CAD BI: CPT

## 2024-10-22 RX ORDER — GADOTERATE MEGLUMINE 376.9 MG/ML
20 INJECTION INTRAVENOUS
Status: COMPLETED | OUTPATIENT
Start: 2024-10-22 | End: 2024-10-22

## 2024-12-07 ENCOUNTER — OFFICE VISIT (OUTPATIENT)
Dept: URGENT CARE | Age: 59
End: 2024-12-07
Payer: COMMERCIAL

## 2024-12-07 VITALS
RESPIRATION RATE: 18 BRPM | BODY MASS INDEX: 36.61 KG/M2 | OXYGEN SATURATION: 98 % | WEIGHT: 220 LBS | HEART RATE: 76 BPM | TEMPERATURE: 97.2 F | SYSTOLIC BLOOD PRESSURE: 112 MMHG | DIASTOLIC BLOOD PRESSURE: 80 MMHG

## 2024-12-07 DIAGNOSIS — L81.9 HYPERPIGMENTATION: Primary | ICD-10-CM

## 2024-12-07 ASSESSMENT — ENCOUNTER SYMPTOMS: ROS SKIN COMMENTS: LEFT THUMB

## 2024-12-07 NOTE — PROGRESS NOTES
Subjective   Patient ID: Beth Leigh is a 59 y.o. female. They present today with a chief complaint of Rash (Brown patch on both hands -today).    History of Present Illness  Beth Leigh is a 59 y.o. female who presents to the clinic for hyperpigmentation of the skin of the left thumb. Pt states she noticed the color change today. Pt denies any itching or discomfort. Pt denies spilling any chemicals on skin.  Pt denies any chest pain, sob, N/V at this time in clinic.         Rash        Past Medical History  Allergies as of 12/07/2024 - Reviewed 07/15/2024   Allergen Reaction Noted    Acyclovir Unknown 09/15/2023    Bacitracin Unknown 09/15/2023    Sulfa (sulfonamide antibiotics) Hives 09/15/2023       (Not in a hospital admission)       Past Medical History:   Diagnosis Date    Neoplasm of unspecified behavior of brain (Multi)     Brain tumor    Personal history of other benign neoplasm 05/25/2021    History of meningioma    Personal history of other diseases of the digestive system     History of gastroesophageal reflux (GERD)    Personal history of other diseases of the musculoskeletal system and connective tissue     History of arthritis    Personal history of other endocrine, nutritional and metabolic disease     History of thyroid disorder    Sixth (abducent) nerve palsy, 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    Sixth cranial nerve palsy    Sixth (abducent) nerve palsy, 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    Sixth cranial nerve palsy       Past Surgical History:   Procedure Laterality Date    BREAST BIOPSY Right     over 20 years ago neg    HERNIA REPAIR  04/14/2015    Inguinal Hernia Repair    HYSTERECTOMY  04/14/2015    Hysterectomy    MR HEAD ANGIO WO IV CONTRAST  03/16/2015    MR HEAD ANGIO WO IV CONTRAST [facility]    MR HEAD ANGIO WO IV CONTRAST  03/10/2016    MR HEAD ANGIO WO IV CONTRAST [facility]    MR HEAD ANGIO WO IV CONTRAST  03/10/2016    MR HEAD ANGIO WO IV CONTRAST [facility]    OTHER SURGICAL HISTORY  01/13/2021    Gamma knife radiosurgery    OTHER SURGICAL HISTORY  01/13/2021    Paraesophageal hiatal hernia repair    THYROID SURGERY  04/14/2015    Thyroid Surgery    US GUIDED THYROID BIOPSY  08/19/2015    US GUIDED THYROID BIOPSY [facility]        reports that she has never smoked. She has never used smokeless tobacco. She reports current drug use. Drug: Amyl nitrate. She reports that she does not drink alcohol.    Review of Systems  Review of Systems   Skin:  Positive for rash.        Left thumb                                  Objective    Vitals:    12/07/24 0934   BP: 112/80   Pulse: 76   Resp: 18   Temp: 36.2 °C (97.2 °F)   SpO2: 98%   Weight: 99.8 kg (220 lb)     No LMP recorded. Patient has had a hysterectomy.    Physical Exam  Constitutional:       Appearance: Normal appearance.   Skin:     Findings: Rash present.      Comments: Left thumb: hyperpigmentation noted.  Cool to touch  Sensation to distal fingers intact, cap refill < 2 seconds.    Neurological:      General: No focal deficit present.      Mental Status: She is alert and oriented to person, place, and time. Mental status is at baseline.   Psychiatric:         Mood and Affect: Mood normal.         Behavior: Behavior normal.         Procedures    Point of Care Test & Imaging Results from this visit  No results found for this visit on 12/07/24.   No results found.    Diagnostic study results (if any) were reviewed by GERARD Nath-CNP.    Assessment/Plan   Allergies, medications, history, and pertinent labs/EKGs/Imaging reviewed by GERARD Nath-CNP.     Medical Decision Making  Will refer to derm. Used alcohol and hibicleanse- some of the color came off onto the cleaning pads. Advised pt if worsening of symptoms, go to ER. Pt verbalized understanding.     Orders and Diagnoses  Diagnoses and all orders for this visit:  Hyperpigmentation      Medical Admin Record      Patient disposition:  Home    Electronically signed by Tashi Fiore, APRN-CNP  10:56 AM

## 2024-12-07 NOTE — PATIENT INSTRUCTIONS
Please follow up with derm- call referral line for   Please call for a follow up appointment with:   [facility]  [address]   [place] [address]  Phone: [phone]  Schedule appointment online: [facility]   [facility] mentor [phone]  If worsening, please go to ER    Please follow up with your primary provider within one week if symptoms do not improve.  You may schedule an appointment online at [email or web address] or call [phone]. Go to the Emergency Department if symptoms significantly worsen or if you develop chest pain or shortness of breath.

## 2024-12-09 ASSESSMENT — DERMATOLOGY QUALITY OF LIFE (QOL) ASSESSMENT
RATE HOW EMOTIONALLY BOTHERED YOU ARE BY YOUR SKIN PROBLEM (FOR EXAMPLE, WORRY, EMBARRASSMENT, FRUSTRATION): 0 - NEVER BOTHERED
WHAT SINGLE SKIN CONDITION LISTED BELOW IS THE PATIENT ANSWERING THE QUALITY-OF-LIFE ASSESSMENT QUESTIONS ABOUT: NONE OF THE ABOVE
DATE THE QUALITY-OF-LIFE ASSESSMENT WAS COMPLETED: 67183
RATE HOW BOTHERED YOU ARE BY EFFECTS OF YOUR SKIN PROBLEMS ON YOUR ACTIVITIES (EG, GOING OUT, ACCOMPLISHING WHAT YOU WANT, WORK ACTIVITIES OR YOUR RELATIONSHIPS WITH OTHERS): 0 - NEVER BOTHERED
RATE HOW EMOTIONALLY BOTHERED YOU ARE BY YOUR SKIN PROBLEM (FOR EXAMPLE, WORRY, EMBARRASSMENT, FRUSTRATION): 0 - NEVER BOTHERED
WHAT SINGLE SKIN CONDITION LISTED BELOW IS THE PATIENT ANSWERING THE QUALITY-OF-LIFE ASSESSMENT QUESTIONS ABOUT: NONE OF THE ABOVE
RATE HOW BOTHERED YOU ARE BY SYMPTOMS OF YOUR SKIN PROBLEM (EG, ITCHING, STINGING BURNING, HURTING OR SKIN IRRITATION): 0 - NEVER BOTHERED
RATE HOW BOTHERED YOU ARE BY SYMPTOMS OF YOUR SKIN PROBLEM (EG, ITCHING, STINGING BURNING, HURTING OR SKIN IRRITATION): 0 - NEVER BOTHERED
RATE HOW BOTHERED YOU ARE BY EFFECTS OF YOUR SKIN PROBLEMS ON YOUR ACTIVITIES (EG, GOING OUT, ACCOMPLISHING WHAT YOU WANT, WORK ACTIVITIES OR YOUR RELATIONSHIPS WITH OTHERS): 0 - NEVER BOTHERED

## 2024-12-09 ASSESSMENT — PATIENT GLOBAL ASSESSMENT (PGA): WHAT IS THE PGA: PATIENT GLOBAL ASSESSMENT:  1 - CLEAR

## 2024-12-10 ENCOUNTER — OFFICE VISIT (OUTPATIENT)
Dept: DERMATOLOGY | Facility: CLINIC | Age: 59
End: 2024-12-10
Payer: COMMERCIAL

## 2024-12-10 DIAGNOSIS — L81.9 PIGMENT DEPOSITION: Primary | ICD-10-CM

## 2024-12-10 PROCEDURE — 99202 OFFICE O/P NEW SF 15 MIN: CPT | Performed by: STUDENT IN AN ORGANIZED HEALTH CARE EDUCATION/TRAINING PROGRAM

## 2024-12-10 NOTE — PROGRESS NOTES
Mani Leigh is a 59 y.o. female who presents for the following: hyperpigmentation (On left hand. Woke up with it on Saturday morning 12/7/2024. ).     Review of Systems:  No other skin or systemic complaints other than what is documented elsewhere in the note.    The following portions of the chart were reviewed this encounter and updated as appropriate:          Skin Cancer History  No skin cancer on file.      Specialty Problems    None       Objective   Well appearing patient in no apparent distress; mood and affect are within normal limits.    A focused skin examination was performed. All findings within normal limits unless otherwise noted below.    Assessment/Plan   1. Pigment deposition (2)  Right Thenar [place]; Left 3rd Finger Tip    Clear today    Exogenous pigment that is clear by now  Noticed on Saturday and by today clear

## 2025-02-13 ENCOUNTER — OFFICE VISIT (OUTPATIENT)
Dept: PRIMARY CARE | Facility: CLINIC | Age: 60
End: 2025-02-13
Payer: COMMERCIAL

## 2025-02-13 VITALS
WEIGHT: 265.4 LBS | HEART RATE: 73 BPM | SYSTOLIC BLOOD PRESSURE: 110 MMHG | TEMPERATURE: 97.7 F | OXYGEN SATURATION: 98 % | BODY MASS INDEX: 44.16 KG/M2 | DIASTOLIC BLOOD PRESSURE: 74 MMHG

## 2025-02-13 DIAGNOSIS — H65.193 ACUTE EFFUSION OF BOTH MIDDLE EARS: ICD-10-CM

## 2025-02-13 DIAGNOSIS — R09.81 SINUS CONGESTION: Primary | ICD-10-CM

## 2025-02-13 PROCEDURE — 1036F TOBACCO NON-USER: CPT

## 2025-02-13 PROCEDURE — 99213 OFFICE O/P EST LOW 20 MIN: CPT

## 2025-02-13 RX ORDER — LORATADINE 10 MG/1
10 TABLET ORAL DAILY
Qty: 30 TABLET | Refills: 0 | Status: SHIPPED | OUTPATIENT
Start: 2025-02-13 | End: 2025-03-15

## 2025-02-13 RX ORDER — FLUTICASONE PROPIONATE 50 MCG
1 SPRAY, SUSPENSION (ML) NASAL DAILY
Qty: 16 G | Refills: 0 | Status: SHIPPED | OUTPATIENT
Start: 2025-02-13 | End: 2026-02-13

## 2025-02-13 ASSESSMENT — COLUMBIA-SUICIDE SEVERITY RATING SCALE - C-SSRS
2. HAVE YOU ACTUALLY HAD ANY THOUGHTS OF KILLING YOURSELF?: NO
6. HAVE YOU EVER DONE ANYTHING, STARTED TO DO ANYTHING, OR PREPARED TO DO ANYTHING TO END YOUR LIFE?: NO
1. IN THE PAST MONTH, HAVE YOU WISHED YOU WERE DEAD OR WISHED YOU COULD GO TO SLEEP AND NOT WAKE UP?: NO

## 2025-02-13 NOTE — PROGRESS NOTES
Subjective     Patient ID: Beth Leigh is a 59 y.o. female who presents for Earache (Pressure , Half deaf /Possible ear infection /1 week ago ).      HPI  Beth presents for bilateral ear pressure and pain.  She admits that she also had viral sinus infection over the last week, symptoms are much improved however she does still have some sinus congestion.  She admits the bilateral ear pressure and pain started during her infection.  Her biggest concern is that she does have significant hearing loss and sensitive ears and is always worried for infection in her ears and a risk for losing more hearing.  She does wear bilateral hearing aids.  Follows with her audiologist annually.    Patient's recent visit notes, medication and allergy lists, past medical surgical social hx, immunization, vitals, problem list, recent tests were reviewed by me for pertinence to this visit.        Review of Systems  All other systems have been reviewed and are negative except as noted in the HPI.         Objective   /74 (BP Location: Left arm, Patient Position: Sitting, BP Cuff Size: Adult)   Pulse 73   Temp 36.5 °C (97.7 °F) (Oral)   Wt 120 kg (265 lb 6.4 oz)   SpO2 98%   BMI 44.16 kg/m²       Physical Exam  Vitals and nursing note reviewed.   Constitutional:       General: She is not in acute distress.     Appearance: Normal appearance.   HENT:      Right Ear: External ear normal. Decreased hearing noted.      Left Ear: External ear normal. Decreased hearing noted.      Ears:      Comments: Wears bilateral hearing aids for decreased hearing.  Bilateral ears have some dry flaky skin noted to canal.  Bilateral tympanic membranes are dull with some notable fluid bubbles.  No erythema or purulent drainage noted.  Neurological:      Mental Status: She is alert.   Psychiatric:         Behavior: Behavior is cooperative.             Assessment & Plan  Sinus congestion  Acute problem, likely related to viral infection.  Symptoms  are improving.  However she can begin Claritin and Flonase nasal spray to improve that congestion which would also help to improve the fluid pressure in her ears.  We discussed taking medication for 2 to 4 weeks to improve symptoms.  If her symptoms worsen she may follow-up with her primary care provider.  Orders:    loratadine (Claritin) 10 mg tablet; Take 1 tablet (10 mg) by mouth once daily.    fluticasone (Flonase) 50 mcg/actuation nasal spray; Administer 1 spray into each nostril once daily. Shake gently. Before first use, prime pump. After use, clean tip and replace cap.    Acute effusion of both middle ears  Plan as above  Orders:    loratadine (Claritin) 10 mg tablet; Take 1 tablet (10 mg) by mouth once daily.    fluticasone (Flonase) 50 mcg/actuation nasal spray; Administer 1 spray into each nostril once daily. Shake gently. Before first use, prime pump. After use, clean tip and replace cap.              Cecy Woods, APRN-CNP

## 2025-03-30 ASSESSMENT — PROMIS GLOBAL HEALTH SCALE
RATE_GENERAL_HEALTH: GOOD
CARRYOUT_SOCIAL_ACTIVITIES: VERY GOOD
EMOTIONAL_PROBLEMS: NEVER
RATE_SOCIAL_SATISFACTION: EXCELLENT
CARRYOUT_PHYSICAL_ACTIVITIES: COMPLETELY
RATE_MENTAL_HEALTH: EXCELLENT
RATE_AVERAGE_FATIGUE: MILD
RATE_QUALITY_OF_LIFE: GOOD
RATE_PHYSICAL_HEALTH: GOOD
RATE_AVERAGE_PAIN: 2

## 2025-04-04 ENCOUNTER — APPOINTMENT (OUTPATIENT)
Dept: PRIMARY CARE | Facility: CLINIC | Age: 60
End: 2025-04-04
Payer: COMMERCIAL

## 2025-04-04 VITALS
HEART RATE: 88 BPM | SYSTOLIC BLOOD PRESSURE: 90 MMHG | DIASTOLIC BLOOD PRESSURE: 58 MMHG | OXYGEN SATURATION: 98 % | TEMPERATURE: 97.3 F | WEIGHT: 237 LBS | BODY MASS INDEX: 39.44 KG/M2

## 2025-04-04 DIAGNOSIS — E78.5 HYPERLIPIDEMIA, UNSPECIFIED HYPERLIPIDEMIA TYPE: ICD-10-CM

## 2025-04-04 DIAGNOSIS — E03.9 ACQUIRED HYPOTHYROIDISM: ICD-10-CM

## 2025-04-04 DIAGNOSIS — E89.2 HYPOPARATHYROIDISM AFTER PROCEDURE (MULTI): ICD-10-CM

## 2025-04-04 DIAGNOSIS — Z12.31 VISIT FOR SCREENING MAMMOGRAM: ICD-10-CM

## 2025-04-04 DIAGNOSIS — Z00.00 GENERAL MEDICAL EXAM: Primary | ICD-10-CM

## 2025-04-04 DIAGNOSIS — Z11.59 ENCOUNTER FOR HEPATITIS C SCREENING TEST FOR LOW RISK PATIENT: ICD-10-CM

## 2025-04-04 PROCEDURE — G8433 SCR FOR DEP NOT CPT DOC RSN: HCPCS | Performed by: PHYSICIAN ASSISTANT

## 2025-04-04 PROCEDURE — 99396 PREV VISIT EST AGE 40-64: CPT | Performed by: PHYSICIAN ASSISTANT

## 2025-04-04 ASSESSMENT — ENCOUNTER SYMPTOMS
ARTHRALGIAS: 1
BLOOD IN STOOL: 0
MYALGIAS: 1
ACTIVITY CHANGE: 0
FREQUENCY: 0
APPETITE CHANGE: 0
NAUSEA: 0
ABDOMINAL PAIN: 0
DIARRHEA: 0
WHEEZING: 0
PALPITATIONS: 0
SHORTNESS OF BREATH: 0
SLEEP DISTURBANCE: 0
CONSTIPATION: 0
NERVOUS/ANXIOUS: 0
DIZZINESS: 0
TREMORS: 0
CHEST TIGHTNESS: 0
COLOR CHANGE: 0
LIGHT-HEADEDNESS: 0

## 2025-04-04 ASSESSMENT — COLUMBIA-SUICIDE SEVERITY RATING SCALE - C-SSRS
1. IN THE PAST MONTH, HAVE YOU WISHED YOU WERE DEAD OR WISHED YOU COULD GO TO SLEEP AND NOT WAKE UP?: NO
2. HAVE YOU ACTUALLY HAD ANY THOUGHTS OF KILLING YOURSELF?: NO
6. HAVE YOU EVER DONE ANYTHING, STARTED TO DO ANYTHING, OR PREPARED TO DO ANYTHING TO END YOUR LIFE?: NO

## 2025-04-04 ASSESSMENT — PATIENT HEALTH QUESTIONNAIRE - PHQ9
2. FEELING DOWN, DEPRESSED OR HOPELESS: NOT AT ALL
SUM OF ALL RESPONSES TO PHQ9 QUESTIONS 1 AND 2: 0
1. LITTLE INTEREST OR PLEASURE IN DOING THINGS: NOT AT ALL

## 2025-04-04 NOTE — PROGRESS NOTES
Subjective   Patient ID: Beth Leigh is a 59 y.o. female who presents for Annual Exam (Patients right knee is still sore after tripping and falling on both knees in December ).    Knee Pain   The incident occurred more than 1 week ago. The incident occurred at home. The injury mechanism was a fall. The pain is present in the left knee and right knee. The pain is mild. The pain has been Fluctuating since onset.    She fell at home in December over a slight step at home.  Injured her shoulder last year had surgery. She was limited and gained weight she lost.  Review of Systems   Constitutional:  Negative for activity change and appetite change.   HENT:  Negative for dental problem.    Eyes:  Negative for visual disturbance.   Respiratory:  Negative for chest tightness, shortness of breath and wheezing.    Cardiovascular:  Negative for chest pain, palpitations and leg swelling.   Gastrointestinal:  Negative for abdominal pain, blood in stool, constipation, diarrhea and nausea.   Endocrine: Negative for cold intolerance and heat intolerance.   Genitourinary:  Negative for frequency and urgency.   Musculoskeletal:  Positive for arthralgias and myalgias.   Skin:  Negative for color change.   Allergic/Immunologic: Negative for immunocompromised state.   Neurological:  Negative for dizziness, tremors and light-headedness.   Psychiatric/Behavioral:  Negative for behavioral problems and sleep disturbance. The patient is not nervous/anxious.        Objective   BP 90/58 (BP Location: Left arm, Patient Position: Sitting, BP Cuff Size: Adult)   Pulse 88   Temp 36.3 °C (97.3 °F) (Temporal)   Wt 108 kg (237 lb)   SpO2 98%   BMI 39.44 kg/m²     Physical Exam  Constitutional:       Appearance: She is obese.   HENT:      Right Ear: Tympanic membrane normal.      Left Ear: Tympanic membrane normal.      Nose: Nose normal.      Mouth/Throat:      Mouth: Mucous membranes are moist.   Eyes:      Extraocular Movements:  Extraocular movements intact.      Pupils: Pupils are equal, round, and reactive to light.   Cardiovascular:      Rate and Rhythm: Normal rate and regular rhythm.      Pulses: Normal pulses.      Heart sounds: No murmur heard.  Pulmonary:      Effort: Pulmonary effort is normal. No respiratory distress.   Chest:   Breasts:     Right: No swelling, bleeding or inverted nipple.      Left: No swelling, bleeding or inverted nipple.   Abdominal:      General: Bowel sounds are normal.      Tenderness: There is no abdominal tenderness.   Musculoskeletal:      Cervical back: Normal range of motion. No tenderness.      Right lower leg: No edema.      Left lower leg: No edema.   Skin:     General: Skin is warm.   Neurological:      General: No focal deficit present.      Mental Status: She is alert. Mental status is at baseline.   Psychiatric:         Mood and Affect: Mood normal.         Thought Content: Thought content normal.         Judgment: Judgment normal.         Assessment/Plan   Diagnoses and all orders for this visit:  General medical exam  -     Urinalysis with Reflex Microscopic; Future  Hyperlipidemia, unspecified hyperlipidemia type  -     CBC and Auto Differential; Future  -     Comprehensive Metabolic Panel; Future  -     Lipid Panel; Future  Acquired hypothyroidism  -     CBC and Auto Differential; Future  -     Comprehensive Metabolic Panel; Future  -     Lipid Panel; Future  Visit for screening mammogram  -     BI mammo bilateral screening tomosynthesis; Future  Encounter for hepatitis C screening test for low risk patient  -     Hepatitis C Antibody; Future  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  She will follow up with Endocrinology.

## 2025-04-12 LAB
ALBUMIN SERPL-MCNC: 4.2 G/DL (ref 3.6–5.1)
ALP SERPL-CCNC: 73 U/L (ref 37–153)
ALT SERPL-CCNC: 10 U/L (ref 6–29)
ANION GAP SERPL CALCULATED.4IONS-SCNC: 10 MMOL/L (CALC) (ref 7–17)
APPEARANCE UR: CLEAR
AST SERPL-CCNC: 15 U/L (ref 10–35)
BACTERIA #/AREA URNS HPF: ABNORMAL /HPF
BASOPHILS # BLD AUTO: 62 CELLS/UL (ref 0–200)
BASOPHILS NFR BLD AUTO: 0.8 %
BILIRUB SERPL-MCNC: 0.4 MG/DL (ref 0.2–1.2)
BILIRUB UR QL STRIP: NEGATIVE
BUN SERPL-MCNC: 19 MG/DL (ref 7–25)
CALCIUM SERPL-MCNC: 8.5 MG/DL (ref 8.6–10.4)
CHLORIDE SERPL-SCNC: 103 MMOL/L (ref 98–110)
CHOLEST SERPL-MCNC: 231 MG/DL
CHOLEST/HDLC SERPL: 3.3 (CALC)
CO2 SERPL-SCNC: 27 MMOL/L (ref 20–32)
COLOR UR: YELLOW
CREAT SERPL-MCNC: 0.77 MG/DL (ref 0.5–1.03)
EGFRCR SERPLBLD CKD-EPI 2021: 89 ML/MIN/1.73M2
EOSINOPHIL # BLD AUTO: 308 CELLS/UL (ref 15–500)
EOSINOPHIL NFR BLD AUTO: 4 %
ERYTHROCYTE [DISTWIDTH] IN BLOOD BY AUTOMATED COUNT: 12.5 % (ref 11–15)
GLUCOSE SERPL-MCNC: 85 MG/DL (ref 65–99)
GLUCOSE UR QL STRIP: NEGATIVE
HCT VFR BLD AUTO: 38.8 % (ref 35–45)
HCV AB SERPL QL IA: NORMAL
HDLC SERPL-MCNC: 70 MG/DL
HGB BLD-MCNC: 13 G/DL (ref 11.7–15.5)
HGB UR QL STRIP: NEGATIVE
HYALINE CASTS #/AREA URNS LPF: ABNORMAL /LPF
KETONES UR QL STRIP: NEGATIVE
LDLC SERPL CALC-MCNC: 139 MG/DL (CALC)
LEUKOCYTE ESTERASE UR QL STRIP: ABNORMAL
LYMPHOCYTES # BLD AUTO: 2195 CELLS/UL (ref 850–3900)
LYMPHOCYTES NFR BLD AUTO: 28.5 %
MCH RBC QN AUTO: 30.3 PG (ref 27–33)
MCHC RBC AUTO-ENTMCNC: 33.5 G/DL (ref 32–36)
MCV RBC AUTO: 90.4 FL (ref 80–100)
MONOCYTES # BLD AUTO: 531 CELLS/UL (ref 200–950)
MONOCYTES NFR BLD AUTO: 6.9 %
NEUTROPHILS # BLD AUTO: 4605 CELLS/UL (ref 1500–7800)
NEUTROPHILS NFR BLD AUTO: 59.8 %
NITRITE UR QL STRIP: NEGATIVE
NONHDLC SERPL-MCNC: 161 MG/DL (CALC)
PH UR STRIP: ABNORMAL [PH] (ref 5–8)
PLATELET # BLD AUTO: 416 THOUSAND/UL (ref 140–400)
PMV BLD REES-ECKER: 11.2 FL (ref 7.5–12.5)
POTASSIUM SERPL-SCNC: 4.8 MMOL/L (ref 3.5–5.3)
PROT SERPL-MCNC: 7.3 G/DL (ref 6.1–8.1)
PROT UR QL STRIP: NEGATIVE
RBC # BLD AUTO: 4.29 MILLION/UL (ref 3.8–5.1)
RBC #/AREA URNS HPF: ABNORMAL /HPF
SERVICE CMNT-IMP: ABNORMAL
SODIUM SERPL-SCNC: 140 MMOL/L (ref 135–146)
SP GR UR STRIP: 1.02 (ref 1–1.03)
SQUAMOUS #/AREA URNS HPF: ABNORMAL /HPF
TRIGL SERPL-MCNC: 105 MG/DL
WBC # BLD AUTO: 7.7 THOUSAND/UL (ref 3.8–10.8)
WBC #/AREA URNS HPF: ABNORMAL /HPF

## 2025-04-28 ENCOUNTER — HOSPITAL ENCOUNTER (OUTPATIENT)
Dept: RADIOLOGY | Facility: HOSPITAL | Age: 60
Discharge: HOME | End: 2025-04-28
Payer: COMMERCIAL

## 2025-04-28 DIAGNOSIS — Z12.31 VISIT FOR SCREENING MAMMOGRAM: ICD-10-CM

## 2025-04-28 PROCEDURE — 77063 BREAST TOMOSYNTHESIS BI: CPT | Performed by: STUDENT IN AN ORGANIZED HEALTH CARE EDUCATION/TRAINING PROGRAM

## 2025-04-28 PROCEDURE — 77067 SCR MAMMO BI INCL CAD: CPT

## 2025-04-28 PROCEDURE — 77067 SCR MAMMO BI INCL CAD: CPT | Performed by: STUDENT IN AN ORGANIZED HEALTH CARE EDUCATION/TRAINING PROGRAM

## 2025-04-30 ENCOUNTER — APPOINTMENT (OUTPATIENT)
Dept: PRIMARY CARE | Facility: CLINIC | Age: 60
End: 2025-04-30
Payer: COMMERCIAL

## 2025-06-11 ENCOUNTER — HOSPITAL ENCOUNTER (OUTPATIENT)
Dept: RADIOLOGY | Facility: CLINIC | Age: 60
Discharge: HOME | End: 2025-06-11
Payer: COMMERCIAL

## 2025-06-11 ENCOUNTER — OFFICE VISIT (OUTPATIENT)
Dept: PRIMARY CARE | Facility: CLINIC | Age: 60
End: 2025-06-11
Payer: COMMERCIAL

## 2025-06-11 VITALS
OXYGEN SATURATION: 98 % | SYSTOLIC BLOOD PRESSURE: 114 MMHG | BODY MASS INDEX: 39.44 KG/M2 | HEART RATE: 88 BPM | TEMPERATURE: 98.2 F | WEIGHT: 237 LBS | DIASTOLIC BLOOD PRESSURE: 76 MMHG

## 2025-06-11 DIAGNOSIS — M25.522 LEFT ELBOW PAIN: ICD-10-CM

## 2025-06-11 DIAGNOSIS — G89.29 CHRONIC PAIN OF RIGHT KNEE: Primary | ICD-10-CM

## 2025-06-11 DIAGNOSIS — M25.561 CHRONIC PAIN OF RIGHT KNEE: ICD-10-CM

## 2025-06-11 DIAGNOSIS — G89.29 CHRONIC PAIN OF RIGHT KNEE: ICD-10-CM

## 2025-06-11 DIAGNOSIS — M25.561 CHRONIC PAIN OF RIGHT KNEE: Primary | ICD-10-CM

## 2025-06-11 PROCEDURE — 99213 OFFICE O/P EST LOW 20 MIN: CPT | Performed by: PHYSICIAN ASSISTANT

## 2025-06-11 PROCEDURE — 73564 X-RAY EXAM KNEE 4 OR MORE: CPT | Mod: RT

## 2025-06-11 PROCEDURE — 73080 X-RAY EXAM OF ELBOW: CPT | Mod: LEFT SIDE | Performed by: RADIOLOGY

## 2025-06-11 PROCEDURE — 73080 X-RAY EXAM OF ELBOW: CPT | Mod: LT

## 2025-06-11 PROCEDURE — 73564 X-RAY EXAM KNEE 4 OR MORE: CPT | Mod: RIGHT SIDE | Performed by: RADIOLOGY

## 2025-06-11 ASSESSMENT — PAIN SCALES - GENERAL: PAINLEVEL_OUTOF10: 2

## 2025-06-12 ASSESSMENT — ENCOUNTER SYMPTOMS
NUMBNESS: 0
MYALGIAS: 1
JOINT SWELLING: 1
ARTHRALGIAS: 1

## 2025-07-14 NOTE — PROGRESS NOTES
HPI   60 yo presents for thyroid/parathyroid here for yearly f/u. Pt has hx of thyroid surgery for benign disease complicated by iatrogenic hypoparathyroidism.     Parathyroid:        She has been taking calcitriol 0.25mcg qd along with Ca 600mg/vitd (4 pills/day, spaced through the day, has sx if dose is lowered/missed), after 2023 visit held calcitriol and repeat calcium-wnl, no sx of low calcium.  24 hr urinary calciums:  2022-215mg  2023-257mg-after this came off calcitriol  2024-233mg    -(low threshold for hctz to lower urinary calcium, bp borderline low,  is why not added at this point)        Thyroid:     Taking levothyroxine 125mcg qd(has fluctuated from 100-125mcg). Pt takes on waking, not missing doses, taking on empty stomach, euthyroid.      Current Outpatient Medications:     ascorbic acid/collagen hydr (ASCORBIC ACID-COLLAGEN ORAL), Take by mouth., Disp: , Rfl:     Boswellia silvia-turmeric xt 500 mg capsule, Take by mouth., Disp: , Rfl:     calcium carbonate-vitamin D3 (Calcium 600 + D,3,) 600 mg-5 mcg (200 unit) capsule, Take 1 tablet by mouth every 4 hours., Disp: , Rfl:     fluticasone (Flonase) 50 mcg/actuation nasal spray, Administer 1 spray into each nostril once daily. Shake gently. Before first use, prime pump. After use, clean tip and replace cap., Disp: 16 g, Rfl: 0    gluc/chondr-msm 7/C/gabriella/boron (GLUCOSAMINE-CHONDR, BOSWELLIA, ORAL), Take by mouth., Disp: , Rfl:     glucosamine sulfate 500 mg capsule, Take 1 capsule by mouth 3 times a day., Disp: , Rfl:     levothyroxine (Synthroid, Levoxyl) 125 mcg tablet, Take 1 tablet (125 mcg) by mouth once daily., Disp: 90 tablet, Rfl: 3    magnesium oxide (Mag-Ox) 400 mg (241.3 mg magnesium) tablet, Take 1 tablet by mouth once daily., Disp: , Rfl:     multivit-min/iron/folic acid/K (ADULTS MULTIVITAMIN ORAL), Take 1 tablet by mouth once daily., Disp: , Rfl:     NON FORMULARY, Vegetarian glucosamine, Disp: , Rfl:     turmeric, bulk, 95 % powder,  "Take by mouth., Disp: , Rfl:     NON FORMULARY, Collagen gummies (Patient not taking: Reported on 7/15/2025), Disp: , Rfl:       Allergies as of 07/15/2025 - Reviewed 07/15/2025   Allergen Reaction Noted    Acyclovir Unknown 09/15/2023    Bacitracin Unknown 09/15/2023    Sulfa (sulfonamide antibiotics) Hives 09/15/2023         Review of Systems   Cardiology: Lightheadedness-denies.  Chest pain-denies.  Leg edema-denies.  Palpitations-denies.  Respiratory: Cough-denies. Shortness of breath-denies.  Wheezing-denies.  Gastroenterology: Constipation-denies.  Diarrhea-denies.  Heartburn-denies.  Endocrinology: Cold intolerance-denies.  Heat intolerance-denies.  Sweats-denies.  Neurology: Headache-denies.  Tremor-denies.  Neuropathy in extremities-denies.  Psychology: Low energy-denies.  Irritability-denies.  Sleep disturbances-denies.      /60   Pulse 75   Wt 104 kg (230 lb)   BMI 38.27 kg/m²       Labs:  Lab Results   Component Value Date    WBC 7.7 04/11/2025    NRBC 0.0 08/28/2024    RBC 4.29 04/11/2025    HGB 13.0 04/11/2025    HCT 38.8 04/11/2025     (H) 04/11/2025     Lab Results   Component Value Date    CALCIUM 8.5 (L) 04/11/2025    AST 15 04/11/2025    ALKPHOS 73 04/11/2025    BILITOT 0.4 04/11/2025    PROT 7.3 04/11/2025    ALBUMIN 4.2 04/11/2025    GLOB 3.4 04/19/2023    AGR 1.2 (L) 04/19/2023     04/11/2025    K 4.8 04/11/2025     04/11/2025    CO2 27 04/11/2025    ANIONGAP 10 04/11/2025    BUN 19 04/11/2025    CREATININE 0.77 04/11/2025    UREACREAUR 26.7 (H) 09/29/2023    GLUCOSE 85 04/11/2025    ALT 10 04/11/2025    EGFR 89 04/11/2025     Lab Results   Component Value Date    CHOL 231 (H) 04/11/2025    TRIG 105 04/11/2025    HDL 70 04/11/2025    LDLCALC 139 (H) 04/11/2025     No results found for: \"MICROALBCREA\"  Lab Results   Component Value Date    TSH 0.65 08/28/2024     No results found for: \"[id number]\"  Lab Results   Component Value Date    HGBA1C 5.3 08/28/2024 "         Physical Exam   General Appearance: pleasant, cooperative, no acute distress  HEENT: no chemosis, no proptosis, no lid lag, no lid retraction  Neck: no lymphadenopathy, no thyroid tissue palpated  Heart: no murmurs, regular rate and rhythm, S1 and S2  Lungs: no wheezes, no rhonci, no rales  Extremities: no lower extremity swelling      Assessment/Plan   1. Postprocedural hypothyroidism (Primary)  -euthyroid, labs reviewed, refills sent , new labs ordered    2. 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

## 2025-07-15 ENCOUNTER — APPOINTMENT (OUTPATIENT)
Dept: ENDOCRINOLOGY | Facility: CLINIC | Age: 60
End: 2025-07-15
Payer: COMMERCIAL

## 2025-07-15 VITALS
WEIGHT: 230 LBS | HEART RATE: 75 BPM | BODY MASS INDEX: 38.27 KG/M2 | DIASTOLIC BLOOD PRESSURE: 60 MMHG | SYSTOLIC BLOOD PRESSURE: 106 MMHG

## 2025-07-15 DIAGNOSIS — E89.2 HYPOPARATHYROIDISM AFTER PROCEDURE (MULTI): ICD-10-CM

## 2025-07-15 DIAGNOSIS — E89.0 POSTPROCEDURAL HYPOTHYROIDISM: Primary | ICD-10-CM

## 2025-07-15 PROCEDURE — 99214 OFFICE O/P EST MOD 30 MIN: CPT | Performed by: INTERNAL MEDICINE

## 2025-07-15 PROCEDURE — 1036F TOBACCO NON-USER: CPT | Performed by: INTERNAL MEDICINE

## 2025-07-15 RX ORDER — LEVOTHYROXINE SODIUM 125 UG/1
125 TABLET ORAL DAILY
Qty: 90 TABLET | Refills: 3 | Status: SHIPPED | OUTPATIENT
Start: 2025-07-15

## 2025-07-15 ASSESSMENT — PATIENT HEALTH QUESTIONNAIRE - PHQ9
SUM OF ALL RESPONSES TO PHQ9 QUESTIONS 1 & 2: 0
2. FEELING DOWN, DEPRESSED OR HOPELESS: NOT AT ALL
1. LITTLE INTEREST OR PLEASURE IN DOING THINGS: NOT AT ALL

## 2025-07-15 ASSESSMENT — LIFESTYLE VARIABLES
HOW OFTEN DO YOU HAVE A DRINK CONTAINING ALCOHOL: NEVER
HOW OFTEN DO YOU HAVE SIX OR MORE DRINKS ON ONE OCCASION: NEVER
SKIP TO QUESTIONS 9-10: 1
HOW MANY STANDARD DRINKS CONTAINING ALCOHOL DO YOU HAVE ON A TYPICAL DAY: PATIENT DOES NOT DRINK
AUDIT-C TOTAL SCORE: 0

## 2025-07-15 ASSESSMENT — ENCOUNTER SYMPTOMS
LOSS OF SENSATION IN FEET: 0
OCCASIONAL FEELINGS OF UNSTEADINESS: 0
DEPRESSION: 0

## 2025-07-15 ASSESSMENT — PAIN SCALES - GENERAL: PAINLEVEL_OUTOF10: 0-NO PAIN

## 2025-07-16 LAB
T4 FREE SERPL-MCNC: 1.7 NG/DL (ref 0.8–1.8)
TSH SERPL-ACNC: 0.13 MIU/L (ref 0.4–4.5)

## 2025-07-29 DIAGNOSIS — E89.0 POSTPROCEDURAL HYPOTHYROIDISM: ICD-10-CM

## 2025-07-29 RX ORDER — LEVOTHYROXINE SODIUM 125 UG/1
125 TABLET ORAL DAILY
Qty: 90 TABLET | Refills: 3 | Status: SHIPPED | OUTPATIENT
Start: 2025-07-29

## 2025-08-21 ENCOUNTER — APPOINTMENT (OUTPATIENT)
Dept: RADIOLOGY | Facility: HOSPITAL | Age: 60
End: 2025-08-21
Payer: COMMERCIAL

## 2025-08-21 ENCOUNTER — HOSPITAL ENCOUNTER (EMERGENCY)
Facility: HOSPITAL | Age: 60
Discharge: HOME | End: 2025-08-21
Attending: EMERGENCY MEDICINE
Payer: COMMERCIAL

## 2025-08-21 ENCOUNTER — APPOINTMENT (OUTPATIENT)
Dept: CARDIOLOGY | Facility: HOSPITAL | Age: 60
End: 2025-08-21
Payer: COMMERCIAL

## 2025-08-21 VITALS
BODY MASS INDEX: 38.32 KG/M2 | WEIGHT: 230 LBS | SYSTOLIC BLOOD PRESSURE: 136 MMHG | DIASTOLIC BLOOD PRESSURE: 91 MMHG | RESPIRATION RATE: 16 BRPM | TEMPERATURE: 97.5 F | OXYGEN SATURATION: 98 % | HEART RATE: 73 BPM | HEIGHT: 65 IN

## 2025-08-21 DIAGNOSIS — E83.51 HYPOCALCEMIA: ICD-10-CM

## 2025-08-21 DIAGNOSIS — R07.9 CHEST PAIN WITH LOW RISK FOR CARDIAC ETIOLOGY: Primary | ICD-10-CM

## 2025-08-21 LAB
ALBUMIN SERPL BCP-MCNC: 4.1 G/DL (ref 3.4–5)
ALP SERPL-CCNC: 67 U/L (ref 33–110)
ALT SERPL W P-5'-P-CCNC: 12 U/L (ref 7–45)
ANION GAP SERPL CALCULATED.3IONS-SCNC: 13 MMOL/L (ref 10–20)
AST SERPL W P-5'-P-CCNC: 15 U/L (ref 9–39)
BASOPHILS # BLD AUTO: 0.06 X10*3/UL (ref 0–0.1)
BASOPHILS NFR BLD AUTO: 0.8 %
BILIRUB SERPL-MCNC: 0.4 MG/DL (ref 0–1.2)
BUN SERPL-MCNC: 22 MG/DL (ref 6–23)
CALCIUM SERPL-MCNC: 8.3 MG/DL (ref 8.6–10.3)
CARDIAC TROPONIN I PNL SERPL HS: 3 NG/L (ref 0–13)
CARDIAC TROPONIN I PNL SERPL HS: <3 NG/L (ref 0–13)
CHLORIDE SERPL-SCNC: 105 MMOL/L (ref 98–107)
CO2 SERPL-SCNC: 26 MMOL/L (ref 21–32)
CREAT SERPL-MCNC: 0.72 MG/DL (ref 0.5–1.05)
D DIMER PPP FEU-MCNC: 0.26 MG/L FEU (ref 0.19–0.5)
EGFRCR SERPLBLD CKD-EPI 2021: >90 ML/MIN/1.73M*2
EOSINOPHIL # BLD AUTO: 0.33 X10*3/UL (ref 0–0.7)
EOSINOPHIL NFR BLD AUTO: 4.2 %
ERYTHROCYTE [DISTWIDTH] IN BLOOD BY AUTOMATED COUNT: 12.7 % (ref 11.5–14.5)
GLUCOSE SERPL-MCNC: 88 MG/DL (ref 74–99)
HCT VFR BLD AUTO: 38.1 % (ref 36–46)
HGB BLD-MCNC: 12.7 G/DL (ref 12–16)
IMM GRANULOCYTES # BLD AUTO: 0.02 X10*3/UL (ref 0–0.7)
IMM GRANULOCYTES NFR BLD AUTO: 0.3 % (ref 0–0.9)
LYMPHOCYTES # BLD AUTO: 2.76 X10*3/UL (ref 1.2–4.8)
LYMPHOCYTES NFR BLD AUTO: 35.2 %
MCH RBC QN AUTO: 30.5 PG (ref 26–34)
MCHC RBC AUTO-ENTMCNC: 33.3 G/DL (ref 32–36)
MCV RBC AUTO: 91 FL (ref 80–100)
MONOCYTES # BLD AUTO: 0.61 X10*3/UL (ref 0.1–1)
MONOCYTES NFR BLD AUTO: 7.8 %
NEUTROPHILS # BLD AUTO: 4.05 X10*3/UL (ref 1.2–7.7)
NEUTROPHILS NFR BLD AUTO: 51.7 %
NRBC BLD-RTO: 0 /100 WBCS (ref 0–0)
PLATELET # BLD AUTO: 379 X10*3/UL (ref 150–450)
POTASSIUM SERPL-SCNC: 3.9 MMOL/L (ref 3.5–5.3)
PROT SERPL-MCNC: 7.3 G/DL (ref 6.4–8.2)
RBC # BLD AUTO: 4.17 X10*6/UL (ref 4–5.2)
SODIUM SERPL-SCNC: 140 MMOL/L (ref 136–145)
WBC # BLD AUTO: 7.8 X10*3/UL (ref 4.4–11.3)

## 2025-08-21 PROCEDURE — 36415 COLL VENOUS BLD VENIPUNCTURE: CPT | Performed by: EMERGENCY MEDICINE

## 2025-08-21 PROCEDURE — 99285 EMERGENCY DEPT VISIT HI MDM: CPT | Mod: 25 | Performed by: EMERGENCY MEDICINE

## 2025-08-21 PROCEDURE — 71045 X-RAY EXAM CHEST 1 VIEW: CPT | Performed by: RADIOLOGY

## 2025-08-21 PROCEDURE — 85025 COMPLETE CBC W/AUTO DIFF WBC: CPT | Performed by: EMERGENCY MEDICINE

## 2025-08-21 PROCEDURE — 84484 ASSAY OF TROPONIN QUANT: CPT | Performed by: EMERGENCY MEDICINE

## 2025-08-21 PROCEDURE — 93005 ELECTROCARDIOGRAM TRACING: CPT

## 2025-08-21 PROCEDURE — 71045 X-RAY EXAM CHEST 1 VIEW: CPT

## 2025-08-21 PROCEDURE — 84075 ASSAY ALKALINE PHOSPHATASE: CPT | Performed by: EMERGENCY MEDICINE

## 2025-08-21 PROCEDURE — 85300 ANTITHROMBIN III ACTIVITY: CPT | Performed by: EMERGENCY MEDICINE

## 2025-08-21 ASSESSMENT — PAIN DESCRIPTION - LOCATION: LOCATION: CHEST

## 2025-08-21 ASSESSMENT — PAIN DESCRIPTION - PAIN TYPE: TYPE: ACUTE PAIN

## 2025-08-21 ASSESSMENT — PAIN - FUNCTIONAL ASSESSMENT: PAIN_FUNCTIONAL_ASSESSMENT: 0-10

## 2025-08-21 ASSESSMENT — PAIN SCALES - GENERAL: PAINLEVEL_OUTOF10: 2

## 2025-08-21 ASSESSMENT — PAIN DESCRIPTION - ORIENTATION: ORIENTATION: MID

## 2025-08-21 ASSESSMENT — PAIN DESCRIPTION - DESCRIPTORS: DESCRIPTORS: ACHING;PRESSURE

## 2025-08-23 LAB
ATRIAL RATE: 71 BPM
P AXIS: 22 DEGREES
P OFFSET: 186 MS
P ONSET: 135 MS
PR INTERVAL: 176 MS
Q ONSET: 223 MS
QRS COUNT: 12 BEATS
QRS DURATION: 88 MS
QT INTERVAL: 418 MS
QTC CALCULATION(BAZETT): 454 MS
QTC FREDERICIA: 442 MS
R AXIS: -14 DEGREES
T AXIS: 0 DEGREES
T OFFSET: 432 MS
VENTRICULAR RATE: 71 BPM

## 2025-08-26 ENCOUNTER — APPOINTMENT (OUTPATIENT)
Dept: PRIMARY CARE | Facility: CLINIC | Age: 60
End: 2025-08-26
Payer: COMMERCIAL

## 2025-08-26 VITALS
WEIGHT: 231 LBS | DIASTOLIC BLOOD PRESSURE: 70 MMHG | TEMPERATURE: 97.1 F | OXYGEN SATURATION: 95 % | SYSTOLIC BLOOD PRESSURE: 118 MMHG | HEART RATE: 98 BPM | BODY MASS INDEX: 38.49 KG/M2 | HEIGHT: 65 IN

## 2025-08-26 DIAGNOSIS — M94.0 COSTOCHONDRITIS: Primary | ICD-10-CM

## 2025-08-26 DIAGNOSIS — D32.9 MENINGIOMA (MULTI): ICD-10-CM

## 2025-08-26 PROCEDURE — 99213 OFFICE O/P EST LOW 20 MIN: CPT | Performed by: PHYSICIAN ASSISTANT

## 2025-08-26 PROCEDURE — 3008F BODY MASS INDEX DOCD: CPT | Performed by: PHYSICIAN ASSISTANT

## 2025-08-26 ASSESSMENT — PATIENT HEALTH QUESTIONNAIRE - PHQ9
SUM OF ALL RESPONSES TO PHQ9 QUESTIONS 1 AND 2: 0
2. FEELING DOWN, DEPRESSED OR HOPELESS: NOT AT ALL
1. LITTLE INTEREST OR PLEASURE IN DOING THINGS: NOT AT ALL

## 2025-08-26 ASSESSMENT — PAIN SCALES - GENERAL: PAINLEVEL_OUTOF10: 2

## 2025-08-27 PROBLEM — D32.9 MENINGIOMA (MULTI): Status: ACTIVE | Noted: 2025-08-27

## 2026-07-14 ENCOUNTER — APPOINTMENT (OUTPATIENT)
Facility: CLINIC | Age: 61
End: 2026-07-14
Payer: COMMERCIAL